# Patient Record
Sex: FEMALE | Race: WHITE
[De-identification: names, ages, dates, MRNs, and addresses within clinical notes are randomized per-mention and may not be internally consistent; named-entity substitution may affect disease eponyms.]

---

## 2020-08-18 ENCOUNTER — HOSPITAL ENCOUNTER (INPATIENT)
Dept: HOSPITAL 36 - GERO | Age: 75
LOS: 14 days | Discharge: NURSING, REHAB OR LONG-TERM CARE | DRG: 885 | End: 2020-09-01
Attending: PSYCHIATRY & NEUROLOGY | Admitting: PSYCHIATRY & NEUROLOGY
Payer: MEDICARE

## 2020-08-18 DIAGNOSIS — E03.9: ICD-10-CM

## 2020-08-18 DIAGNOSIS — I10: ICD-10-CM

## 2020-08-18 DIAGNOSIS — Z88.2: ICD-10-CM

## 2020-08-18 DIAGNOSIS — Z20.828: ICD-10-CM

## 2020-08-18 DIAGNOSIS — Z88.1: ICD-10-CM

## 2020-08-18 DIAGNOSIS — R62.7: ICD-10-CM

## 2020-08-18 DIAGNOSIS — Z88.0: ICD-10-CM

## 2020-08-18 DIAGNOSIS — E78.5: ICD-10-CM

## 2020-08-18 DIAGNOSIS — E87.6: ICD-10-CM

## 2020-08-18 DIAGNOSIS — E11.65: ICD-10-CM

## 2020-08-18 DIAGNOSIS — Z88.8: ICD-10-CM

## 2020-08-18 DIAGNOSIS — F33.3: Primary | ICD-10-CM

## 2020-08-18 PROCEDURE — Z7610: HCPCS

## 2020-08-18 PROCEDURE — X3904: HCPCS

## 2020-08-19 VITALS — SYSTOLIC BLOOD PRESSURE: 130 MMHG | DIASTOLIC BLOOD PRESSURE: 80 MMHG

## 2020-08-19 LAB
CHOLEST SERPL-MCNC: 206 MG/DL (ref ?–200)
LDLC SERPL DIRECT ASSAY-MCNC: 125 MG/DL (ref 0–129)
TRIGL SERPL-MCNC: 169 MG/DL (ref 30–150)

## 2020-08-19 NOTE — HISTORY & PHYSICAL
ADMIT DATE:  08/19/2020



HISTORY OF PRESENT ILLNESS:  We have a 75-year-old female with hypertension and

hyperlipidemia, who presents to the ER at [facility].  The patient has

psychosis.  The patient was transferred here.  The patient admits to

hypertension, hyperlipidemia.  The patient denies any chest pain, shortness of

breath, nausea, vomiting, abdominal pain, diarrhea.



PAST MEDICAL HISTORY:

1.  Hypertension.

2.  Hyperlipidemia.



PAST SURGICAL HISTORY:  None.



MEDICATIONS:  List reviewed.



ALLERGIES:  None.



SOCIAL HISTORY:  Tobacco, IV drugs, ETOH negative.



PHYSICAL EXAMINATION:

VITAL SIGNS:  Temperature is 97.6, pulse 80, respirations 20, blood pressure

130/80.

HEENT:  Normocephalic, atraumatic head exam.

NECK:  Supple.

CARDIOVASCULAR:  Regular rate and rhythm.

LUNGS:  Decreased breath sounds.

ABDOMEN:  Soft, nontender.

EXTREMITIES:  No edema, cyanosis or clubbing.

NEUROLOGIC:  Cranial nerve exam is grossly intact.



ASSESSMENT AND PLAN:

1.  Hypertension.

2.  Hyperlipidemia.

3.  Hyperglycemia.



PLAN:  The patient will be continued on home meds.  We will discuss with

psychiatrist plus medical management.





DD: 08/19/2020 12:56

DT: 08/19/2020 13:05

JOB# [id number]  [id number]

## 2020-08-19 NOTE — HISTORY & PHYSICAL
ADMIT DATE:     08/19/2020



IDENTIFYING INFORMATION:  The patient is a 75-year-old female.



CHIEF COMPLAINT:  "I'm here because my back hurts."



HISTORY OF PRESENT ILLNESS:  Danger to self, grave disability.  The patient

became agitated when her  increase her psych medication.  The patient

also not eating and not sleeping, unable to care for herself under the care of

Dr. Quiñonez.  When I talked to her the patient has no clue why she is here, she

said "I am here because of my back problems.  I told her she was in psych

facility and she could not explain it.  She said she was hospitalized before in

a hospital because of back pain and because of similar situation, so she was not

a very good historian.  The patient denies any auditory or visual

hallucinations.  She reports she sleeps well, eats well.  The patient onset of

illness.  The patient is not sure if she has any psychiatric condition, however,

she has a history of depression.  The patient with a history of depression and

anxiety.  The patient denies prior suicide attempt.  She reports she was

hospitalized because of her back problems.  She does see a psychiatrist and she

said because of back pain, so she is a poor historian, very poor insight.  She

denies prior suicide attempt.  She does not want to harm herself today or

anyone.  The patient denies feeling paranoid.  She sleeps well, eats well.



PAST PSYCHIATRIC HISTORY:  One prior hospitalization because of back pain in a

psychiatric facility.



SUBSTANCE ABUSE HISTORY:  The patient denies.



MEDICAL HISTORY:  Deferred to the medical doctor.



ALLERGIES:  THE PATIENT IS ALLERGIC TO LEVOFLOXACIN, PENICILLIN,

SULFAMETHOXAZOLE, .



MEDICATIONS:  The patient is not on any psychotropic medication.



FAMILY AND SOCIAL HISTORY:  The patient reports she has been  a long

time.  Unable to tell me how long she has 2 children, a boy and a girl.  The

patient reports she has high school education, some college.  She said she is

reluctant.  She is currently retired.  Denies substance abuse, denies family

psychotic disorder; however, she is a poor historian.



MENTAL STATUS EXAMINATION:  The patient was appropriately dressed, not very

groomed.  She was in bed.  Her affect is flat.  Thoughts are concrete.  Speech

is coherent.  She was in a long-term memory is good.  She remember her age, date

of birth.  Recent memory is poor.  She is not sure of the results of the

situation that led to her admission.  The patient reports sleeps well, eats

well.  The patient denies any intent to harm herself or anyone; however, she has

been unable to take care of her ADLs.  The patient was a poor historian, unable

to tell me the day.  She unable tell me the . 

Long-term goals poor, cannot remember the .  Her

concentration is poor.  Unable to answer questions appropriately.  Recent memory

is poor, does not really remember the events that her admission.  Her insight

about her illness is poor, does not realize her problems are.  Judgment is poor

with her being unable to care for self.



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

cognitive disorder, not otherwise specified.



Her assets, she wants to get help.  Negative poor coping skills.



PLAN:  The patient will be started on Cymbalta.  We will do group therapy,

milieu therapy, and individual therapy.



ESTIMATED LENGTH OF STAY:  3-7 days.



DISCHARGE CRITERIA:  Decreasing depression, able to go to after discharge,

outpatient treatment.





DD: 08/19/2020 08:50

DT: 08/19/2020 14:06

JOB# [id number]  [id number]

HANNAH

## 2020-08-20 LAB — HBA1C BLD-MCNC: 6.9 % (ref 4.8–5.6)

## 2020-08-20 NOTE — INTERNAL MEDICINE PROG NOTE
Internal Medicine Subjective





- Subjective


Service Date: 08/20/20


Patient seen and examined:: without staff


Patient is:: awake


Patient Complaints of:: congestion


Per staff patient has:: no adverse event, no episodes of fall





Internal Medicine Objective





- Results


Recent Labs: 


 Laboratory Last Values











POC Glucose  132 MG/DL (70 - 105)  H  08/19/20  02:47    


 


Hemoglobin A1c  6.9 % (4.8-5.6)  H  08/19/20  10:35    


 


Triglycerides  169 mg/dL ()  H  08/19/20  10:35    


 


Cholesterol  206 mg/dL (<200)  H  08/19/20  10:35    


 


LDL Cholesterol  125 mg/dL (0-129)   08/19/20  10:35    


 


HDL Cholesterol  53 mg/dL (>55)  L  08/19/20  10:35    














- Physical Exam


Vitals and I&O: 


 Vital Signs











Temp  97.9 F   08/20/20 14:00


 


Pulse  92   08/20/20 14:00


 


Resp  20   08/20/20 14:00


 


BP  103/75   08/20/20 14:00


 


Pulse Ox  97   08/20/20 14:00








 Intake & Output











 08/19/20 08/20/20 08/20/20





 18:59 06:59 18:59


 


Intake Total 1200 120 


 


Balance 1200 120 


 


Intake:   


 


  Oral 1200 120 


 


Other:   


 


  # Voids  3 


 


  # Bowel Movements 3  


 


  Stool Characteristics Soft Soft Soft





 Brown Brown Brown











Active Medications: 


Current Medications





Acetaminophen (Tylenol)  650 mg PO Q4H PRN


   PRN Reason: Pain (Mild 1-3)


   Stop: 10/18/20 03:15


Acetaminophen (Tylenol Extra Strength)  1,000 mg PO Q6H PRN


   PRN Reason: Pain (Moderate 4-6)


   Stop: 10/18/20 03:17


Al Hydrox/Mg Hydrox/Simethicone (Maalox)  30 ml PO Q4HR PRN


   PRN Reason: GI DISTRESS


   Stop: 10/18/20 03:29


Atorvastatin Calcium (Lipitor)  10 mg PO DAILY [facility]; Protocol


   Stop: 10/19/20 08:59


   Last Admin: 08/20/20 09:10 Dose:  10 mg


Duloxetine HCl (Cymbalta)  60 mg PO DAILY [facility]; Protocol


   Stop: 10/19/20 09:59


   Last Admin: 08/20/20 11:00 Dose:  60 mg


Hydrochlorothiazide (Hctz)  12.5 mg PO DAILY SERGIO


   Stop: 10/19/20 08:59


   Last Admin: 08/20/20 09:10 Dose:  12.5 mg


Ibuprofen (Motrin)  400 mg PO Q4H PRN


   PRN Reason: Pain (Severe 7-10)


   Stop: 10/18/20 03:17


Lorazepam (Ativan)  0.5 mg PO Q4HR PRN; Protocol


   PRN Reason: Anxiety


   Stop: 09/18/20 03:29


Magnesium Hydroxide (Milk Of Magnesia)  30 ml PO HS PRN


   PRN Reason: Constipation


Metoprolol Tartrate (Lopressor)  25 mg PO BID SERGIO


   Stop: 10/19/20 08:59


   Last Admin: 08/20/20 09:12 Dose:  25 mg


Multivitamins/Vitamin C (Theragran)  1 tab PO DAILY SERGIO


   Stop: 10/18/20 08:59


   Last Admin: 08/20/20 09:12 Dose:  1 tab


Zolpidem Tartrate (Ambien)  5 mg PO HS PRN


   PRN Reason: Insomnia


   Stop: 10/18/20 03:29








General: weak


HEENT: NC/AT, PERRLA, EOMI


Neck: Supple


Lungs: CTAB


Cardiovascular: RRR, Normal S1, Normal S2


Abdomen: soft, non-tender


Extremities: clear





- Procedures


Procedures: 


 Procedures











Procedure Code Date


 


APPLY FOREARM SPLINT 01409 05/13/16


 


GROUP PSYCHOTHERAPY 03757 05/26/16


 


GROUP PSYCHOTHERAPY GZHZZZZ 05/26/16


 


IMMOBILIZATION OF LEFT LOWER ARM USING SPLINT [id number] 05/13/16


 


OTHER GROUP THERAPY 94.44 05/27/15


 


REPOSITION LEFT RADIUS WITH INT FIX, OPEN APPROACH [id number] 05/22/16


 


TREAT FX RAD INTRA-ARTICUL 42968 05/22/16














Internal Medicine Assmt/Plan





- Assessment


Assessment: 





1.  HTN


2.  D.M.


3.  Hypothyroidism





- Plan


Plan: 





check cbc, cmp


reivewed labs


d/w r.n.


reviewed complete medical records

## 2020-08-21 LAB
BASOPHILS # BLD AUTO: 0.1 K/UL (ref 0–0.2)
BASOPHILS NFR BLD AUTO: 0.7 % (ref 0–2)
EOSINOPHIL # BLD AUTO: 0.1 K/UL (ref 0–0.4)
EOSINOPHIL NFR BLD AUTO: 0.9 % (ref 0–4)
ERYTHROCYTE [DISTWIDTH] IN BLOOD BY AUTOMATED COUNT: 14.3 % (ref 9–15)
HCT VFR BLD CALC: 42.7 % (ref 36–48)
HGB BLD-MCNC: 14 G/DL (ref 12–16)
LYMPHOCYTE AB SER FC-ACNC: 2 K/UL (ref 1–5.5)
LYMPHOCYTES NFR BLD AUTO: 25.1 % (ref 20.5–51.5)
MCH RBC QN AUTO: 29 PG (ref 27–31)
MCHC RBC AUTO-ENTMCNC: 33 % (ref 32–36)
MCV RBC AUTO: 89 FL (ref 79–98)
MONOCYTES # BLD AUTO: 0.4 K/UL (ref 0–1)
MONOCYTES NFR BLD AUTO: 5.2 % (ref 1.7–9.3)
NEUTROPHILS # BLD: 5.4 K/UL (ref 1.8–7.7)
NEUTROPHILS NFR BLD AUTO: 68.1 % (ref 40–70)
PLATELET # BLD: 210 K/UL (ref 130–430)
RBC # BLD AUTO: 4.82 MIL/UL (ref 4.2–6.2)
WBC # BLD AUTO: 7.9 K/UL (ref 4.8–10.8)

## 2020-08-21 NOTE — INTERNAL MEDICINE PROG NOTE
Internal Medicine Subjective





- Subjective


Service Date: 20


Patient seen and examined:: without staff


Patient is:: awake


Patient Complaints of:: congestion


Per staff patient has:: no adverse event, no episodes of fall





Internal Medicine Objective





- Results


Result Diagrams: 


 20 13:30





Recent Labs: 


 Laboratory Last Values











WBC  7.9 K/uL (4.8-10.8)   20  13:30    


 


RBC  4.82 MIL/uL (4.2-6.2)   20  13:30    


 


Hgb  14.0 g/dL (12.0-16.0)   20  13:30    


 


Hct  42.7 % (36-48)   20  13:30    


 


MCV  89 fL (79.0-98.0)   20  13:30    


 


MCH  29 pg (27-31)   20  13:30    


 


MCHC  33 % (32-36)   20  13:30    


 


RDW  14.3 % (9.0-15.0)   20  13:30    


 


Plt Count  210 K/uL (130-430)   20  13:30    


 


MPV  8.8 fl (7.4-10.4)   20  13:30    


 


Neut % (Auto)  68.1 % (40-70)   20  13:30    


 


Lymph % (Auto)  25.1 % (20.5-51.5)   20  13:30    


 


Mono % (Auto)  5.2 % (1.7-9.3)   20  13:30    


 


Eos % (Auto)  0.9 % (0-4)   20  13:30    


 


Baso % (Auto)  0.7 % (0.0-2.0)   20  13:30    


 


Neut # (Auto)  5.4 K/uL (1.8-7.7)   20  13:30    


 


Lymph # (Auto)  2.0 K/uL (1.0-5.5)   20  13:30    


 


Mono # (Auto)  0.4 K/uL (0.0-1.0)   20  13:30    


 


Eos # (Auto)  0.1 K/uL (0.0-0.4)   20  13:30    


 


Baso # (Auto)  0.1 K/uL (0.0-0.2)   20  13:30    


 


POC Glucose  132 MG/DL (70 - 105)  H  20  02:47    


 


Hemoglobin A1c  6.9 % (4.8-5.6)  H  20  10:35    


 


Triglycerides  169 mg/dL ()  H  20  10:35    


 


Cholesterol  206 mg/dL (<200)  H  20  10:35    


 


LDL Cholesterol  125 mg/dL (0-129)   20  10:35    


 


HDL Cholesterol  53 mg/dL (>55)  L  20  10:35    


 


TSH  3.96 uIU/ml (0.358-3.740)  H  20  13:30    


 


Coronavirus (PCR)  NOT DETECTED  (NOT DETECTD)   20  18:40    














- Physical Exam


Vitals and I&O: 


 Vital Signs











Temp  97.7 F   20 14:00


 


Pulse  91   20 14:00


 


Resp  20   20 14:00


 


BP  143/54   20 14:00


 


Pulse Ox  96   20 14:00








 Intake & Output











 20





 18:59 06:59 18:59


 


Intake Total 860 120 


 


Balance 860 120 


 


Intake:   


 


  Oral 860 120 


 


Other:   


 


  # Voids 3 3 


 


  # Bowel Movements 0  


 


  Stool Characteristics Soft Soft Soft





 Brown Brown Brown











Active Medications: 


Current Medications





Acetaminophen (Tylenol)  650 mg PO Q4H PRN


   PRN Reason: Pain (Mild 1-3)


   Stop: 10/18/20 03:15


Acetaminophen (Tylenol Extra Strength)  1,000 mg PO Q6H PRN


   PRN Reason: Pain (Moderate 4-6)


   Stop: 10/18/20 03:17


Al Hydrox/Mg Hydrox/Simethicone (Maalox)  30 ml PO Q4HR PRN


   PRN Reason: GI DISTRESS


   Stop: 10/18/20 03:29


Atorvastatin Calcium (Lipitor)  10 mg PO DAILY [facility]; Protocol


   Stop: 10/19/20 08:59


   Last Admin: 20 09:07 Dose:  10 mg


Duloxetine HCl (Cymbalta)  60 mg PO DAILY [facility]; Protocol


   Stop: 10/19/20 09:59


   Last Admin: 20 09:07 Dose:  60 mg


Hydrochlorothiazide (Hctz)  12.5 mg PO DAILY SERGIO


   Stop: 10/19/20 08:59


   Last Admin: 20 09:07 Dose:  12.5 mg


Ibuprofen (Motrin)  400 mg PO Q4H PRN


   PRN Reason: Pain (Severe 7-10)


   Stop: 10/18/20 03:17


Lorazepam (Ativan)  0.5 mg PO Q4HR PRN; Protocol


   PRN Reason: Anxiety


   Stop: 20 03:29


   Last Admin: 20 06:58 Dose:  0.5 mg


Magnesium Hydroxide (Milk Of Magnesia)  30 ml PO HS PRN


   PRN Reason: Constipation


Metoprolol Succinate (Toprol Xl)  50 mg PO DAILY SERGIO


   Stop: 10/20/20 08:59


   Last Admin: 20 09:07 Dose:  50 mg


Multivitamins/Vitamin C (Theragran)  1 tab PO DAILY SERGIO


   Stop: 10/18/20 08:59


   Last Admin: 20 09:08 Dose:  1 tab


Quetiapine Fumarate (Seroquel)  12.5 mg PO BID SERGIO; Protocol


   Stop: 10/20/20 16:59


Zolpidem Tartrate (Ambien)  5 mg PO HS PRN


   PRN Reason: Insomnia


   Stop: 10/18/20 03:29








General: weak


HEENT: NC/AT, PERRLA, EOMI


Neck: Supple


Lungs: CTAB


Cardiovascular: RRR, Normal S1, Normal S2


Abdomen: soft, non-tender


Extremities: clear





- Procedures


Procedures: 


 Procedures











Procedure Code Date


 


APPLY FOREARM SPLINT 11930 16


 


GROUP PSYCHOTHERAPY 50372 16


 


GROUP PSYCHOTHERAPY GZHZZZZ 16


 


IMMOBILIZATION OF LEFT LOWER ARM USING SPLINT [id number] 16


 


OTHER GROUP THERAPY 94.44 05/27/15


 


REPOSITION LEFT RADIUS WITH INT FIX, OPEN APPROACH [id number] 16


 


TREAT FX RAD INTRA-ARTICUL 61586 16














Internal Medicine Assmt/Plan





- Assessment


Assessment: 





1.  HTN


2.  D.M.


3.  Hypothyroidism





- Plan


Plan: 





check bmp


d/w r.n.


reviewed complete medical records





Nutritional Asmnt/Malnutr-PDOC





- Dietary Evaluation


Malnutrition Findings (Please click <Entered> for more info): 








Nutritional Asmnt/Malnutrition                             Start:  20 11:

54


Text:                                                      Status: Complete    

  


Freq:                                                                          

  


Protocol:                                                                      

  


 Document     20 12:02  ANASTASIYA  (Rec: 20 12:14  ANASTASIYA [id number]

-01)


 Nutritional Asmnt/Malnutrition


     Patient General Information


      Nutritional Screening                      Moderate Risk


      Diagnosis                                  Psychosis


      Pertinent Medical Hx/Surgical Hx           HTN, Hyperlipidemia


      Subjective Information                     Pt is a 75-year-old female


                                                 admitted on  d/t grave


                                                 disability and danger to self.


                                                 Pt is eating an estimated 0-


                                                 75%% of meals Per Meal/


                                                 Nutrition Activity Record


                                                 since admit date (x2 days), pt


                                                 ate 75% meals on admit day


                                                 and refused breakfast and


                                                 lunch yesterday, eating 25%


                                                 dinner. Dietary is currently


                                                 providing an estimated 2020


                                                 kcals and 95 gm Pro. Visited


                                                 pt in room, CNA stated the


                                                 patient is refusing everything


                                                 today, just having liquids.


                                                 Got her a Glucerna shake in


                                                 case she decides not to eat


                                                 lunch. Pt did not want to talk


                                                 further. Pt need some


                                                 adjusting time, will F/U in 3-


                                                 5 days and continue to monitor


                                                 PO intake and provide


                                                 Glucerna as appropriate.


                                                 Recommend adding CCHO to


                                                 current diet d/t labs ()


                                                 A1c 6.9%, POC glucose 132.


                                                 Anthropometrics


                                                 HT: 58


                                                 WT: 203 LB (92.27 kg)


                                                 ABW: 156 LB (70.80 kg)


                                                 BMI: 30.87 (Obese, class I)


                                                 GI/ Skin Integrity


                                                 GI: WNL, Soft, Large, Non-


                                                 tender


                                                 BM: 8/19 x3


                                                 I/O: 980/Not Noted


                                                 Skin: Wound and bruises, RT/LT


                                                 hand bruise, LT Forearm skin


                                                 tear


                                                 Ward: 17


                                                 Diet Order: Cardiac


                                                 Estimated Energy Needs: (Obese


                                                 , ABW)


                                                 7005-0767 kcals (20-25 kcals/


                                                 kg)


                                                 60-70g Pro (0.8-1.0 g/kg)


                                                 8571-0086 ml (20-25 ml/kg)


      Current Diet Order/ Nutrition Support      Cardiac


      Patient / S.O                              Can


      Pertinent Medications                      Maalox (PRN), Lipitor,


                                                 Hydrochlorothiazide, MOM (PRN)


                                                 , Theragran


      Pertinent Labs                             : Triglycerides 169,


                                                 Cholesterol 206, HDL 53, A1c 6


                                                 .9%, POC glucose 132


     Nutritional Hx/Data


      Height                                     1.73 m


      Height (Calculated Centimeters)            172.7


      Current Weight (lbs)                       92.079 kg


      Weight (Calculated Kilograms)              92.1


      Weight (Calculated Grams)                  05605.3


      Ideal Body Weight                          140 LB (63.64 kg)


      % Ideal Body Weight                        145


      Body Mass Index (BMI)                      30.9


      Weight Status                              Obese


     GI Symptoms


      Last BM                                    8/19 x3


      Skin Integrity/Comment:                    Skin: Wound and bruises, RT/LT


                                                 hand bruise, LT Forearm skin


                                                 tear


                                                 Ward: 17


      Current %PO                                Fair (50-74%)


     Estimated Nutritional Goals


      BEE in Kcals:                              Adj wt of IBW


      Calories/Kcals/Kg                          20-25


      Kcals Calculated                           5314-5849


      Protein:                                   Adj wt of IBW


      Protein g/k.8-1.0


      Protein Calculated                         60-70


      Fluid: ml                                  1497-5807 ml (20-25 ml/kg)


     Nutritional Problem


      2. Problem


       Problem                                   Obese, class I


       Etiology                                  r/t consistent energy


                                                 overconsumption


       Signs/Symptoms:                           aeb BMI >30 (30.90).


      1. Problem


       Problem                                   Impaired nutrient utilization


       Etiology                                  r/t endocrine dysfuction


       Signs/Symptoms:                           aeb labs () A1c 6.9%, POC


                                                 glucose 132.


     Intervention/Recommendation


      Comments                                   1.Recommend adding CCHO to


                                                 current diet Rx.


                                                 2.Provide Glucerna Hunger


                                                 Smart as appropriate when PO <


                                                 50%.


     Expected Outcomes/Goals


      Expected Outcomes/Goals                    1.PO intake to meet 75% of


                                                 estimated nutritional needs.


                                                 2.Monitor PO intake, wt,


                                                 nutrition related labs to


                                                 trend WNL, and skin integrity


                                                 to trend WNL.


                                                 3.F/U as moderate risk in 3-5


                                                 days, -.

## 2020-08-21 NOTE — PROGRESS NOTES
DATE:  08/21/2020



[id number] SUBJECTIVE:  Chart was reviewed and the patient interviewed.  Also

discussed the patient's condition with the staff and reviewed records and labs. 

The patient is still angry and in irritable mood.  The patient also is

exhibiting lack of motivations and is still showing poor interest and isolative

and withdrawn.  The patient also is sleeping a lot and refusing to eat.  Also,

the patient has episodes of yelling and screaming for no reason.



During interview, the patient seems to be responding to stimuli and easily

irritable and easily agitated.  Otherwise, the patient is compliant with taking

Cymbalta that was increased to 60 mg every day yesterday with no side effects.



ASSESSMENT:  The patient is still depressed, but also seems to be psychotic and

agitated.



TREATMENT PLAN:  Continue to monitor behavior and condition closely.  Also, we

will add Seroquel in a dose of 12.5 mg twice a day.  Also, continue to work on

both her ineffective coping as well as her psychosis and continue to follow up

closely.





DD: 08/21/2020 17:59

DT: 08/21/2020 18:04

JOB# [id number]  [id number]

## 2020-08-21 NOTE — PROGRESS NOTES
DATE:  08/20/2020



PSYCHIATRIC PROGRESS NOTE



This also will be for yesterday 08/20/2020.



SUBJECTIVE:  Chart reviewed and the patient interviewed.  Also discussed the

patient's condition with the staff and reviewed records and labs.  The patient

continued to be in a depressed mood and isolative and withdrawn.  The patient

also is not interacting much with peers or others and she wants to be left

alone.  The patient also is easily irritable and easily agitated, but is not

exhibiting any behavioral problems except at times seems to be actively

responding.  Also, the patient is selectively interacting with others and

selectively mute during interview.  The patient is showing no motivations and

seems to be hopeless.



ASSESSMENT:  The patient is still severely depressed and seems to be psychotic

and responding.



TREATMENT PLAN:  Continue to monitor her behavior and her condition.  Also, we

will increase Cymbalta to 60 mg every day and continue to follow up closely.





DD: 08/21/2020 18:13

DT: 08/21/2020 18:34

JOB# [id number]  [id number]

## 2020-08-22 NOTE — PROGRESS NOTES
DATE:  08/22/2020



Covering for Quin Quiñonez M.D.



SUBJECTIVE:  The patient was interviewed.  Case was discussed with staff.  Chart

and records were reviewed.  The patient has had poor motivation for treatment,

has been withdrawn, has been depressed, has been with poor hygiene.  Staff needs

significant prompting and assistance to help the patient attend to her basic

hygiene and eat her meals.  The patient was recently started on Seroquel.  No

side effects noted.  The patient is selectively mute throughout the interview. 

Poor eye contact, not engaging at all with the interview, appears to be restless

and anxious in her wheelchair.



MENTAL STATUS EXAMINATION:  The patient is with improved hygiene, freshly

showered with the assistance of staff.  She is restless and anxious in her

wheelchair.  She has poor eye contact.  Speech is selectively mute.  Mood and

affect appear to be anxious and labile.  Thought process appears to be somewhat

disorganized at this time.  Unable to assess for suicidal or homicidal thoughts.

 She appears to be internally preoccupied.  The patient is alert and oriented to

her name, otherwise unable to assess.  Insight, judgment and impulse control

appear to be quite poor at this time.



ASSESSMENT AND PLAN:  The patient continues to require acute inpatient

psychiatric hospitalization.  We will continue her medications as prescribed. 

No side effects have been noted.  We will also encourage the patient to

verbalize her needs and participate in group and milieu therapy.





DD: 08/22/2020 11:24

DT: 08/22/2020 11:39

JOB# [id number]  [id number]

## 2020-08-22 NOTE — INTERNAL MEDICINE PROG NOTE
Internal Medicine Subjective





- Subjective


Service Date: 20


Patient seen and examined:: without staff (no polyuria, no polyphagia, no 

polydypsia)


Patient is:: awake


Patient Complaints of:: congestion


Per staff patient has:: no adverse event, no episodes of fall





Internal Medicine Objective





- Results


Result Diagrams: 


 20 13:30





Recent Labs: 


 Laboratory Last Values











WBC  7.9 K/uL (4.8-10.8)   20  13:30    


 


RBC  4.82 MIL/uL (4.2-6.2)   20  13:30    


 


Hgb  14.0 g/dL (12.0-16.0)   20  13:30    


 


Hct  42.7 % (36-48)   20  13:30    


 


MCV  89 fL (79.0-98.0)   20  13:30    


 


MCH  29 pg (27-31)   20  13:30    


 


MCHC  33 % (32-36)   20  13:30    


 


RDW  14.3 % (9.0-15.0)   20  13:30    


 


Plt Count  210 K/uL (130-430)   20  13:30    


 


MPV  8.8 fl (7.4-10.4)   20  13:30    


 


Neut % (Auto)  68.1 % (40-70)   20  13:30    


 


Lymph % (Auto)  25.1 % (20.5-51.5)   20  13:30    


 


Mono % (Auto)  5.2 % (1.7-9.3)   20  13:30    


 


Eos % (Auto)  0.9 % (0-4)   20  13:30    


 


Baso % (Auto)  0.7 % (0.0-2.0)   20  13:30    


 


Neut # (Auto)  5.4 K/uL (1.8-7.7)   20  13:30    


 


Lymph # (Auto)  2.0 K/uL (1.0-5.5)   20  13:30    


 


Mono # (Auto)  0.4 K/uL (0.0-1.0)   20  13:30    


 


Eos # (Auto)  0.1 K/uL (0.0-0.4)   20  13:30    


 


Baso # (Auto)  0.1 K/uL (0.0-0.2)   20  13:30    


 


POC Glucose  132 MG/DL (70 - 105)  H  20  02:47    


 


Hemoglobin A1c  6.9 % (4.8-5.6)  H  20  10:35    


 


Triglycerides  169 mg/dL ()  H  20  10:35    


 


Cholesterol  206 mg/dL (<200)  H  20  10:35    


 


LDL Cholesterol  125 mg/dL (0-129)   20  10:35    


 


HDL Cholesterol  53 mg/dL (>55)  L  20  10:35    


 


TSH  3.96 uIU/ml (0.358-3.740)  H  20  13:30    


 


Coronavirus (PCR)  NOT DETECTED  (NOT DETECTD)   20  18:40    














- Physical Exam


Vitals and I&O: 


 Vital Signs











Temp  97.4 F   20 06:11


 


Pulse  84   20 08:19


 


Resp  19   20 06:11


 


BP  153/71   20 08:19


 


Pulse Ox  94   20 06:11








 Intake & Output











 20





 18:59 06:59 18:59


 


Intake Total 860 300 


 


Output Total  1 


 


Balance 860 299 


 


Intake:   


 


  Oral 860 300 


 


Output:   


 


  Urine/Stool Mix  1 


 


Other:   


 


  # Voids 3 1 


 


  # Bowel Movements 1 0 


 


  Stool Characteristics Soft Soft Soft





 Brown Brown Brown











Active Medications: 


Current Medications





Acetaminophen (Tylenol)  650 mg PO Q4H PRN


   PRN Reason: Pain (Mild 1-3)


   Stop: 10/18/20 03:15


Acetaminophen (Tylenol Extra Strength)  1,000 mg PO Q6H PRN


   PRN Reason: Pain (Moderate 4-6)


   Stop: 10/18/20 03:17


Al Hydrox/Mg Hydrox/Simethicone (Maalox)  30 ml PO Q4HR PRN


   PRN Reason: GI DISTRESS


   Stop: 10/18/20 03:29


Atorvastatin Calcium (Lipitor)  10 mg PO DAILY SERGIO; Protocol


   Stop: 10/19/20 08:59


   Last Admin: 20 08:18 Dose:  10 mg


Duloxetine HCl (Cymbalta)  60 mg PO DAILY [facility]; Protocol


   Stop: 10/19/20 09:59


   Last Admin: 20 08:18 Dose:  60 mg


Hydrochlorothiazide (Hctz)  12.5 mg PO DAILY SERGIO


   Stop: 10/19/20 08:59


   Last Admin: 20 08:19 Dose:  12.5 mg


Ibuprofen (Motrin)  400 mg PO Q4H PRN


   PRN Reason: Pain (Severe 7-10)


   Stop: 10/18/20 03:17


Lorazepam (Ativan)  0.5 mg PO Q4HR PRN; Protocol


   PRN Reason: Anxiety


   Stop: 20 03:29


   Last Admin: 20 06:58 Dose:  0.5 mg


Magnesium Hydroxide (Milk Of Magnesia)  30 ml PO HS PRN


   PRN Reason: Constipation


Metoprolol Succinate (Toprol Xl)  50 mg PO DAILY [facility]


   Stop: 10/20/20 08:59


   Last Admin: 20 08:19 Dose:  50 mg


Multivitamins/Vitamin C (Theragran)  1 tab PO DAILY SERGIO


   Stop: 10/18/20 08:59


   Last Admin: 20 08:19 Dose:  1 tab


Quetiapine Fumarate (Seroquel)  12.5 mg PO BID [facility]; Protocol


   Stop: 10/20/20 16:59


   Last Admin: 20 08:19 Dose:  12.5 mg


Zolpidem Tartrate (Ambien)  5 mg PO HS PRN


   PRN Reason: Insomnia


   Stop: 10/18/20 03:29








General: weak


HEENT: NC/AT, PERRLA, EOMI


Neck: Supple


Lungs: CTAB


Cardiovascular: RRR, Normal S1, Normal S2


Abdomen: soft, non-tender


Extremities: clear





- Procedures


Procedures: 


 Procedures











Procedure Code Date


 


APPLY FOREARM SPLINT 79184 16


 


GROUP PSYCHOTHERAPY 79763 16


 


GROUP PSYCHOTHERAPY [id number] 16


 


IMMOBILIZATION OF LEFT LOWER ARM USING SPLINT [id number] 16


 


OTHER GROUP THERAPY 94.44 05/27/15


 


REPOSITION LEFT RADIUS WITH INT FIX, OPEN APPROACH 5LAK40U 16


 


TREAT FX RAD INTRA-ARTICUL 49312 16














Internal Medicine Assmt/Plan





- Assessment


Assessment: 





1. DM, new diagnosis


2. HTN


3.  Hypothyroidism





- Plan


Plan: 





will start glucophage once lab results are back


d/w r.n.


reviewed complete medical records





Nutritional Asmnt/Malnutr-PDOC





- Dietary Evaluation


Malnutrition Findings (Please click <Entered> for more info): 








Nutritional Asmnt/Malnutrition                             Start:  20 11:

54


Text:                                                      Status: Complete    

  


Freq:                                                                          

  


Protocol:                                                                      

  


 Document     20 12:02  ANASTASIYA  (Rec: 20 12:14  ANASTASIYA [id number]

-01)


 Nutritional Asmnt/Malnutrition


     Patient General Information


      Nutritional Screening                      Moderate Risk


      Diagnosis                                  Psychosis


      Pertinent Medical Hx/Surgical Hx           HTN, Hyperlipidemia


      Subjective Information                     Pt is a 75-year-old female


                                                 admitted on  d/t grave


                                                 disability and danger to self.


                                                 Pt is eating an estimated 0-


                                                 75%% of meals Per Meal/


                                                 Nutrition Activity Record


                                                 since admit date (x2 days), pt


                                                 ate 75% meals on admit day


                                                 and refused breakfast and


                                                 lunch yesterday, eating 25%


                                                 dinner. Dietary is currently


                                                 providing an estimated 2020


                                                 kcals and 95 gm Pro. Visited


                                                 pt in room, CNA stated the


                                                 patient is refusing everything


                                                 today, just having liquids.


                                                 Got her a Glucerna shake in


                                                 case she decides not to eat


                                                 lunch. Pt did not want to talk


                                                 further. Pt need some


                                                 adjusting time, will F/U in 3-


                                                 5 days and continue to monitor


                                                 PO intake and provide


                                                 Glucerna as appropriate.


                                                 Recommend adding CCHO to


                                                 current diet d/t labs ()


                                                 A1c 6.9%, POC glucose 132.


                                                 Anthropometrics


                                                 HT: 58


                                                 WT: 203 LB (92.27 kg)


                                                 ABW: 156 LB (70.80 kg)


                                                 BMI: 30.87 (Obese, class I)


                                                 GI/ Skin Integrity


                                                 GI: WNL, Soft, Large, Non-


                                                 tender


                                                 BM: 8/19 x3


                                                 I/O: 980/Not Noted


                                                 Skin: Wound and bruises, RT/LT


                                                 hand bruise, LT Forearm skin


                                                 tear


                                                 Ward: 17


                                                 Diet Order: Cardiac


                                                 Estimated Energy Needs: (Obese


                                                 , ABW)


                                                 5845-8888 kcals (20-25 kcals/


                                                 kg)


                                                 60-70g Pro (0.8-1.0 g/kg)


                                                 1890-4970 ml (20-25 ml/kg)


      Current Diet Order/ Nutrition Support      Cardiac


      Patient / S.O                              Can


      Pertinent Medications                      Maalox (PRN), Lipitor,


                                                 Hydrochlorothiazide, MOM (PRN)


                                                 , Theragran


      Pertinent Labs                             : Triglycerides 169,


                                                 Cholesterol 206, HDL 53, A1c 6


                                                 .9%, POC glucose 132


     Nutritional Hx/Data


      Height                                     1.73 m


      Height (Calculated Centimeters)            172.7


      Current Weight (lbs)                       92.079 kg


      Weight (Calculated Kilograms)              92.1


      Weight (Calculated Grams)                  63097.3


      Ideal Body Weight                          140 LB (63.64 kg)


      % Ideal Body Weight                        145


      Body Mass Index (BMI)                      30.9


      Weight Status                              Obese


     GI Symptoms


      Last BM                                    8/19 x3


      Skin Integrity/Comment:                    Skin: Wound and bruises, RT/LT


                                                 hand bruise, LT Forearm skin


                                                 tear


                                                 Ward: 17


      Current %PO                                Fair (50-74%)


     Estimated Nutritional Goals


      BEE in Kcals:                              Adj wt of IBW


      Calories/Kcals/Kg                          20-25


      Kcals Calculated                           1041-6636


      Protein:                                   Adj wt of IBW


      Protein g/k.8-1.0


      Protein Calculated                         60-70


      Fluid: ml                                  8641-7717 ml (20-25 ml/kg)


     Nutritional Problem


      2. Problem


       Problem                                   Obese, class I


       Etiology                                  r/t consistent energy


                                                 overconsumption


       Signs/Symptoms:                           aeb BMI >30 (30.90).


      1. Problem


       Problem                                   Impaired nutrient utilization


       Etiology                                  r/t endocrine dysfuction


       Signs/Symptoms:                           aeb labs () A1c 6.9%, POC


                                                 glucose 132.


     Intervention/Recommendation


      Comments                                   1.Recommend adding CCHO to


                                                 current diet Rx.


                                                 2.Provide Glucerna Hunger


                                                 Smart as appropriate when PO <


                                                 50%.


     Expected Outcomes/Goals


      Expected Outcomes/Goals                    1.PO intake to meet 75% of


                                                 estimated nutritional needs.


                                                 2.Monitor PO intake, wt,


                                                 nutrition related labs to


                                                 trend WNL, and skin integrity


                                                 to trend WNL.


                                                 3.F/U as moderate risk in 3-5


                                                 days, -.

## 2020-08-23 NOTE — INTERNAL MEDICINE PROG NOTE
Internal Medicine Subjective





- Subjective


Service Date: 20


Patient seen and examined:: without staff


Patient is:: awake


Patient Complaints of:: congestion


Per staff patient has:: no adverse event, no episodes of fall





Internal Medicine Objective





- Results


Result Diagrams: 


 20 13:30





Recent Labs: 


 Laboratory Last Values











WBC  7.9 K/uL (4.8-10.8)   20  13:30    


 


RBC  4.82 MIL/uL (4.2-6.2)   20  13:30    


 


Hgb  14.0 g/dL (12.0-16.0)   20  13:30    


 


Hct  42.7 % (36-48)   20  13:30    


 


MCV  89 fL (79.0-98.0)   20  13:30    


 


MCH  29 pg (27-31)   20  13:30    


 


MCHC  33 % (32-36)   20  13:30    


 


RDW  14.3 % (9.0-15.0)   20  13:30    


 


Plt Count  210 K/uL (130-430)   20  13:30    


 


MPV  8.8 fl (7.4-10.4)   20  13:30    


 


Neut % (Auto)  68.1 % (40-70)   20  13:30    


 


Lymph % (Auto)  25.1 % (20.5-51.5)   20  13:30    


 


Mono % (Auto)  5.2 % (1.7-9.3)   20  13:30    


 


Eos % (Auto)  0.9 % (0-4)   20  13:30    


 


Baso % (Auto)  0.7 % (0.0-2.0)   20  13:30    


 


Neut # (Auto)  5.4 K/uL (1.8-7.7)   20  13:30    


 


Lymph # (Auto)  2.0 K/uL (1.0-5.5)   20  13:30    


 


Mono # (Auto)  0.4 K/uL (0.0-1.0)   20  13:30    


 


Eos # (Auto)  0.1 K/uL (0.0-0.4)   20  13:30    


 


Baso # (Auto)  0.1 K/uL (0.0-0.2)   20  13:30    


 


POC Glucose  132 MG/DL (70 - 105)  H  20  02:47    


 


Hemoglobin A1c  6.9 % (4.8-5.6)  H  20  10:35    


 


Triglycerides  169 mg/dL ()  H  20  10:35    


 


Cholesterol  206 mg/dL (<200)  H  20  10:35    


 


LDL Cholesterol  125 mg/dL (0-129)   20  10:35    


 


HDL Cholesterol  53 mg/dL (>55)  L  20  10:35    


 


TSH  3.96 uIU/ml (0.358-3.740)  H  20  13:30    


 


Coronavirus (PCR)  NOT DETECTED  (NOT DETECTD)   20  18:40    














- Physical Exam


Vitals and I&O: 


 Vital Signs











Temp  97.3 F   20 05:58


 


Pulse  91   20 09:22


 


Resp  20   20 08:00


 


BP  139/59   20 09:23


 


Pulse Ox  92   20 05:58








 Intake & Output











 20





 18:59 06:59 18:59


 


Intake Total 660 240 


 


Balance 660 240 


 


Intake:   


 


  Oral 660 240 


 


Other:   


 


  # Voids 2 2 


 


  # Bowel Movements 0  


 


  Stool Characteristics Soft Soft 





 Brown Brown 











Active Medications: 


Current Medications





Acetaminophen (Tylenol)  650 mg PO Q4H PRN


   PRN Reason: Pain (Mild 1-3)


   Stop: 10/18/20 03:15


Acetaminophen (Tylenol Extra Strength)  1,000 mg PO Q6H PRN


   PRN Reason: Pain (Moderate 4-6)


   Stop: 10/18/20 03:17


Al Hydrox/Mg Hydrox/Simethicone (Maalox)  30 ml PO Q4HR PRN


   PRN Reason: GI DISTRESS


   Stop: 10/18/20 03:29


Atorvastatin Calcium (Lipitor)  10 mg PO DAILY [facility]; Protocol


   Stop: 10/19/20 08:59


   Last Admin: 20 09:21 Dose:  10 mg


Duloxetine HCl (Cymbalta)  60 mg PO DAILY [facility]; Protocol


   Stop: 10/19/20 09:59


   Last Admin: 20 09:22 Dose:  60 mg


Hydrochlorothiazide (Hctz)  12.5 mg PO DAILY [facility]


   Stop: 10/19/20 08:59


   Last Admin: 20 09:23 Dose:  12.5 mg


Ibuprofen (Motrin)  400 mg PO Q4H PRN


   PRN Reason: Pain (Severe 7-10)


   Stop: 10/18/20 03:17


Lorazepam (Ativan)  0.5 mg PO Q4HR PRN; Protocol


   PRN Reason: Anxiety


   Stop: 20 03:29


   Last Admin: 20 06:58 Dose:  0.5 mg


Magnesium Hydroxide (Milk Of Magnesia)  30 ml PO HS PRN


   PRN Reason: Constipation


Metoprolol Succinate (Toprol Xl)  50 mg PO DAILY [facility]


   Stop: 10/20/20 08:59


   Last Admin: 20 09:22 Dose:  50 mg


Multivitamins/Vitamin C (Theragran)  1 tab PO DAILY [facility]


   Stop: 10/18/20 08:59


   Last Admin: 20 09:21 Dose:  1 tab


Quetiapine Fumarate (Seroquel)  12.5 mg PO BID [facility]; Protocol


   Stop: 10/20/20 16:59


   Last Admin: 20 09:22 Dose:  12.5 mg


Zolpidem Tartrate (Ambien)  5 mg PO HS PRN


   PRN Reason: Insomnia


   Stop: 10/18/20 03:29








General: weak


HEENT: NC/AT, PERRLA, EOMI


Neck: Supple


Lungs: CTAB


Cardiovascular: RRR, Normal S1, Normal S2


Abdomen: soft, non-tender


Extremities: clear


Neurological: no change





- Procedures


Procedures: 


 Procedures











Procedure Code Date


 


APPLY FOREARM SPLINT 96198 16


 


GROUP PSYCHOTHERAPY 02096 16


 


GROUP PSYCHOTHERAPY [id number] 16


 


IMMOBILIZATION OF LEFT LOWER ARM USING SPLINT 8E4OY2T 16


 


OTHER GROUP THERAPY 94.44 05/27/15


 


REPOSITION LEFT RADIUS WITH INT FIX, OPEN APPROACH 5JXX23P 16


 


TREAT FX RAD INTRA-ARTICUL 91210 16














Internal Medicine Assmt/Plan





- Assessment


Assessment: 





1. DM, new diagnosis


2. HTN


3. Hypothyroidism





- Plan


Plan: 





will start glucophage once lab results are back


d/w r.n.


reviewed complete medical records





Nutritional Asmnt/Malnutr-PDOC





- Dietary Evaluation


Malnutrition Findings (Please click <Entered> for more info): 








Nutritional Asmnt/Malnutrition                             Start:  20 11:

54


Text:                                                      Status: Complete    

  


Freq:                                                                          

  


Protocol:                                                                      

  


 Document     20 12:02  ANASTASIYA  (Rec: 20 12:14  ANASTASIYA [id number]

-01)


 Nutritional Asmnt/Malnutrition


     Patient General Information


      Nutritional Screening                      Moderate Risk


      Diagnosis                                  Psychosis


      Pertinent Medical Hx/Surgical Hx           HTN, Hyperlipidemia


      Subjective Information                     Pt is a 75-year-old female


                                                 admitted on  d/t grave


                                                 disability and danger to self.


                                                 Pt is eating an estimated 0-


                                                 75%% of meals Per Meal/


                                                 Nutrition Activity Record


                                                 since admit date (x2 days), pt


                                                 ate 75% meals on admit day


                                                 and refused breakfast and


                                                 lunch yesterday, eating 25%


                                                 dinner. Dietary is currently


                                                 providing an estimated 2020


                                                 kcals and 95 gm Pro. Visited


                                                 pt in room, CNA stated the


                                                 patient is refusing everything


                                                 today, just having liquids.


                                                 Got her a Glucerna shake in


                                                 case she decides not to eat


                                                 lunch. Pt did not want to talk


                                                 further. Pt need some


                                                 adjusting time, will F/U in 3-


                                                 5 days and continue to monitor


                                                 PO intake and provide


                                                 Glucerna as appropriate.


                                                 Recommend adding CCHO to


                                                 current diet d/t labs ()


                                                 A1c 6.9%, POC glucose 132.


                                                 Anthropometrics


                                                 HT: 58


                                                 WT: 203 LB (92.27 kg)


                                                 ABW: 156 LB (70.80 kg)


                                                 BMI: 30.87 (Obese, class I)


                                                 GI/ Skin Integrity


                                                 GI: WNL, Soft, Large, Non-


                                                 tender


                                                 BM: 8/19 x3


                                                 I/O: 980/Not Noted


                                                 Skin: Wound and bruises, RT/LT


                                                 hand bruise, LT Forearm skin


                                                 tear


                                                 Ward: 17


                                                 Diet Order: Cardiac


                                                 Estimated Energy Needs: (Obese


                                                 , ABW)


                                                 3395-0497 kcals (20-25 kcals/


                                                 kg)


                                                 60-70g Pro (0.8-1.0 g/kg)


                                                 9008-5001 ml (20-25 ml/kg)


      Current Diet Order/ Nutrition Support      Cardiac


      Patient / S.O                              Can


      Pertinent Medications                      Maalox (PRN), Lipitor,


                                                 Hydrochlorothiazide, MOM (PRN)


                                                 , Theragran


      Pertinent Labs                             : Triglycerides 169,


                                                 Cholesterol 206, HDL 53, A1c 6


                                                 .9%, POC glucose 132


     Nutritional Hx/Data


      Height                                     1.73 m


      Height (Calculated Centimeters)            172.7


      Current Weight (lbs)                       92.079 kg


      Weight (Calculated Kilograms)              92.1


      Weight (Calculated Grams)                  08789.3


      Ideal Body Weight                          140 LB (63.64 kg)


      % Ideal Body Weight                        145


      Body Mass Index (BMI)                      30.9


      Weight Status                              Obese


     GI Symptoms


      Last BM                                    8/19 x3


      Skin Integrity/Comment:                    Skin: Wound and bruises, RT/LT


                                                 hand bruise, LT Forearm skin


                                                 tear


                                                 Ward: 17


      Current %PO                                Fair (50-74%)


     Estimated Nutritional Goals


      BEE in Kcals:                              Adj wt of IBW


      Calories/Kcals/Kg                          20-25


      Kcals Calculated                           1666-5681


      Protein:                                   Adj wt of IBW


      Protein g/k.8-1.0


      Protein Calculated                         60-70


      Fluid: ml                                  3728-0348 ml (20-25 ml/kg)


     Nutritional Problem


      2. Problem


       Problem                                   Obese, class I


       Etiology                                  r/t consistent energy


                                                 overconsumption


       Signs/Symptoms:                           aeb BMI >30 (30.90).


      1. Problem


       Problem                                   Impaired nutrient utilization


       Etiology                                  r/t endocrine dysfuction


       Signs/Symptoms:                           aeb labs () A1c 6.9%, POC


                                                 glucose 132.


     Intervention/Recommendation


      Comments                                   1.Recommend adding CCHO to


                                                 current diet Rx.


                                                 2.Provide Glucerna Hunger


                                                 Smart as appropriate when PO <


                                                 50%.


     Expected Outcomes/Goals


      Expected Outcomes/Goals                    1.PO intake to meet 75% of


                                                 estimated nutritional needs.


                                                 2.Monitor PO intake, wt,


                                                 nutrition related labs to


                                                 trend WNL, and skin integrity


                                                 to trend WNL.


                                                 3.F/U as moderate risk in 3-5


                                                 days, -.

## 2020-08-23 NOTE — PROGRESS NOTES
DATE:  08/23/2020



Covering for Quin Quiñonez M.D.



SUBJECTIVE:  The patient was interviewed.  Case was discussed with staff.  Chart

and records were reviewed.  Per the staff, the patient has been more calm today,

but remains selectively mute, withdrawn, forgetful and confused.  The patient

was visited this morning in the dayroom.  She is sitting in her wheelchair,

looking down, not able to maintain any eye contact, remains selectively mute,

not answering any questions.  Appears with improved hygiene due to staff

assistance in helping the patient take a shower and clean herself up.  The

patient otherwise is disorganized and uncooperative and has no plan for

self-care.



MENTAL STATUS EXAMINATION:  The patient is an elderly female sitting in the

wheelchair.  Poor eye contact, looking down on the ground, disorganized

thinking, selectively mute, very poor insight, judgment and impulse control. 

Alert and oriented to her name, otherwise unable to assess.



ASSESSMENT AND PLAN:  We will continue the patient's acute psychiatric

hospitalization given the severity of her symptoms.  Also, encouraged the

patient to verbalize needs and participate in group and milieu therapy and also

attend to her hygiene.  We will continue medications for now.  No side effects

have been noted.





DD: 08/23/2020 10:47

DT: 08/23/2020 11:25

JOB# [id number]  [id number]

## 2020-08-24 LAB
BASOPHILS # BLD AUTO: 0.1 K/UL (ref 0–0.2)
BASOPHILS NFR BLD AUTO: 1.1 % (ref 0–2)
EOSINOPHIL # BLD AUTO: 0.1 K/UL (ref 0–0.4)
EOSINOPHIL NFR BLD AUTO: 1.4 % (ref 0–4)
ERYTHROCYTE [DISTWIDTH] IN BLOOD BY AUTOMATED COUNT: 14.3 % (ref 9–15)
HCT VFR BLD CALC: 37.6 % (ref 36–48)
HGB BLD-MCNC: 12.9 G/DL (ref 12–16)
LYMPHOCYTE AB SER FC-ACNC: 2.3 K/UL (ref 1–5.5)
LYMPHOCYTES NFR BLD AUTO: 27.4 % (ref 20.5–51.5)
MCH RBC QN AUTO: 30 PG (ref 27–31)
MCHC RBC AUTO-ENTMCNC: 34 % (ref 32–36)
MCV RBC AUTO: 87 FL (ref 79–98)
MONOCYTES # BLD AUTO: 0.5 K/UL (ref 0–1)
MONOCYTES NFR BLD AUTO: 5.5 % (ref 1.7–9.3)
NEUTROPHILS # BLD: 5.3 K/UL (ref 1.8–7.7)
NEUTROPHILS NFR BLD AUTO: 64.6 % (ref 40–70)
PLATELET # BLD: 202 K/UL (ref 130–430)
RBC # BLD AUTO: 4.31 MIL/UL (ref 4.2–6.2)
WBC # BLD AUTO: 8.3 K/UL (ref 4.8–10.8)

## 2020-08-24 NOTE — INTERNAL MEDICINE PROG NOTE
Internal Medicine Subjective





- Subjective


Service Date: 20


Patient seen and examined:: without staff


Patient is:: awake


Patient Complaints of:: congestion


Per staff patient has:: no adverse event, no episodes of fall





Internal Medicine Objective





- Results


Result Diagrams: 


 20 08:05





Recent Labs: 


 Laboratory Last Values











WBC  8.3 K/uL (4.8-10.8)   20  08:05    


 


RBC  4.31 MIL/uL (4.2-6.2)   20  08:05    


 


Hgb  12.9 g/dL (12.0-16.0)   20  08:05    


 


Hct  37.6 % (36-48)   20  08:05    


 


MCV  87 fL (79.0-98.0)   20  08:05    


 


MCH  30 pg (27-31)   20  08:05    


 


MCHC  34 % (32-36)   20  08:05    


 


RDW  14.3 % (9.0-15.0)   20  08:05    


 


Plt Count  202 K/uL (130-430)   20  08:05    


 


MPV  9.4 fl (7.4-10.4)   20  08:05    


 


Neut % (Auto)  64.6 % (40-70)   20  08:05    


 


Lymph % (Auto)  27.4 % (20.5-51.5)   20  08:05    


 


Mono % (Auto)  5.5 % (1.7-9.3)   20  08:05    


 


Eos % (Auto)  1.4 % (0-4)   20  08:05    


 


Baso % (Auto)  1.1 % (0.0-2.0)   20  08:05    


 


Neut # (Auto)  5.3 K/uL (1.8-7.7)   20  08:05    


 


Lymph # (Auto)  2.3 K/uL (1.0-5.5)   20  08:05    


 


Mono # (Auto)  0.5 K/uL (0.0-1.0)   20  08:05    


 


Eos # (Auto)  0.1 K/uL (0.0-0.4)   20  08:05    


 


Baso # (Auto)  0.1 K/uL (0.0-0.2)   20  08:05    


 


POC Glucose  132 MG/DL (70 - 105)  H  20  02:47    


 


Hemoglobin A1c  6.9 % (4.8-5.6)  H  20  10:35    


 


Triglycerides  169 mg/dL ()  H  20  10:35    


 


Cholesterol  206 mg/dL (<200)  H  20  10:35    


 


LDL Cholesterol  125 mg/dL (0-129)   20  10:35    


 


HDL Cholesterol  53 mg/dL (>55)  L  20  10:35    


 


TSH  3.96 uIU/ml (0.358-3.740)  H  20  13:30    


 


Coronavirus (PCR)  NOT DETECTED  (NOT DETECTD)   20  18:40    














- Physical Exam


Vitals and I&O: 


 Vital Signs











Temp  97 F   20 06:02


 


Pulse  73   20 09:05


 


Resp  18   20 08:00


 


BP  125/47   20 09:05


 


Pulse Ox  92   20 06:02








 Intake & Output











 20





 18:59 06:59 18:59


 


Intake Total 0  


 


Balance 0  


 


Intake:   


 


  Oral 0  


 


Other:   


 


  # Voids  3 


 


  # Bowel Movements  0 


 


  Stool Characteristics  Soft 





  Brown 











Active Medications: 


Current Medications





Acetaminophen (Tylenol)  650 mg PO Q4H PRN


   PRN Reason: Pain (Mild 1-3)


   Stop: 10/18/20 03:15


Acetaminophen (Tylenol Extra Strength)  1,000 mg PO Q6H PRN


   PRN Reason: Pain (Moderate 4-6)


   Stop: 10/18/20 03:17


Al Hydrox/Mg Hydrox/Simethicone (Maalox)  30 ml PO Q4HR PRN


   PRN Reason: GI DISTRESS


   Stop: 10/18/20 03:29


Atorvastatin Calcium (Lipitor)  10 mg PO DAILY [facility]; Protocol


   Stop: 10/19/20 08:59


   Last Admin: 20 09:03 Dose:  10 mg


Duloxetine HCl (Cymbalta)  60 mg PO DAILY [facility]; Protocol


   Stop: 10/19/20 09:59


   Last Admin: 20 09:03 Dose:  60 mg


Hydrochlorothiazide (Hctz)  12.5 mg PO DAILY [facility]


   Stop: 10/19/20 08:59


   Last Admin: 20 09:04 Dose:  Not Given


Ibuprofen (Motrin)  400 mg PO Q4H PRN


   PRN Reason: Pain (Severe 7-10)


   Stop: 10/18/20 03:17


Lorazepam (Ativan)  0.5 mg PO Q4HR PRN; Protocol


   PRN Reason: Anxiety


   Stop: 20 03:29


   Last Admin: 20 06:58 Dose:  0.5 mg


Magnesium Hydroxide (Milk Of Magnesia)  30 ml PO HS PRN


   PRN Reason: Constipation


Metoprolol Succinate (Toprol Xl)  50 mg PO DAILY [facility]


   Stop: 10/20/20 08:59


   Last Admin: 20 09:05 Dose:  Not Given


Multivitamins/Vitamin C (Theragran)  1 tab PO DAILY [facility]


   Stop: 10/18/20 08:59


   Last Admin: 20 09:03 Dose:  1 tab


Quetiapine Fumarate (Seroquel)  25 mg PO BID [facility]; Protocol


   Stop: 10/23/20 16:59


Zolpidem Tartrate (Ambien)  5 mg PO HS PRN


   PRN Reason: Insomnia


   Stop: 10/18/20 03:29








General: weak


HEENT: NC/AT, PERRLA, EOMI


Neck: Supple


Lungs: CTAB


Cardiovascular: RRR, Normal S1, Normal S2


Abdomen: soft, non-tender


Extremities: clear


Neurological: no change





- Procedures


Procedures: 


 Procedures











Procedure Code Date


 


APPLY FOREARM SPLINT 85142 16


 


GROUP PSYCHOTHERAPY 36475 16


 


GROUP PSYCHOTHERAPY [id number] 16


 


IMMOBILIZATION OF LEFT LOWER ARM USING SPLINT [id number] 16


 


OTHER GROUP THERAPY 94.44 05/27/15


 


REPOSITION LEFT RADIUS WITH INT FIX, OPEN APPROACH [id number] 16


 


TREAT FX RAD INTRA-ARTICUL 21579 16














Internal Medicine Assmt/Plan





- Assessment


Assessment: 





1. DM, new diagnosis


2. HTN


3. Hypothyroidism





- Plan


Plan: 





will start glucophage once lab results are back


d/w r.n.


reviewed complete medical records





Nutritional Asmnt/Malnutr-PDOC





- Dietary Evaluation


Malnutrition Findings (Please click <Entered> for more info): 








Nutritional Asmnt/Malnutrition                             Start:  20 11:

54


Text:                                                      Status: Complete    

  


Freq:                                                                          

  


Protocol:                                                                      

  


 Document     20 12:02  ANASTASIYA  (Rec: 20 12:14  ANASTASIYA [id number]

-01)


 Nutritional Asmnt/Malnutrition


     Patient General Information


      Nutritional Screening                      Moderate Risk


      Diagnosis                                  Psychosis


      Pertinent Medical Hx/Surgical Hx           HTN, Hyperlipidemia


      Subjective Information                     Pt is a 75-year-old female


                                                 admitted on  d/t grave


                                                 disability and danger to self.


                                                 Pt is eating an estimated 0-


                                                 75%% of meals Per Meal/


                                                 Nutrition Activity Record


                                                 since admit date (x2 days), pt


                                                 ate 75% meals on admit day


                                                 and refused breakfast and


                                                 lunch yesterday, eating 25%


                                                 dinner. Dietary is currently


                                                 providing an estimated 2020


                                                 kcals and 95 gm Pro. Visited


                                                 pt in room, CNA stated the


                                                 patient is refusing everything


                                                 today, just having liquids.


                                                 Got her a Glucerna shake in


                                                 case she decides not to eat


                                                 lunch. Pt did not want to talk


                                                 further. Pt need some


                                                 adjusting time, will F/U in 3-


                                                 5 days and continue to monitor


                                                 PO intake and provide


                                                 Glucerna as appropriate.


                                                 Recommend adding CCHO to


                                                 current diet d/t labs ()


                                                 A1c 6.9%, POC glucose 132.


                                                 Anthropometrics


                                                 HT: 58


                                                 WT: 203 LB (92.27 kg)


                                                 ABW: 156 LB (70.80 kg)


                                                 BMI: 30.87 (Obese, class I)


                                                 GI/ Skin Integrity


                                                 GI: WNL, Soft, Large, Non-


                                                 tender


                                                 BM: 8/19 x3


                                                 I/O: 980/Not Noted


                                                 Skin: Wound and bruises, RT/LT


                                                 hand bruise, LT Forearm skin


                                                 tear


                                                 Ward: 17


                                                 Diet Order: Cardiac


                                                 Estimated Energy Needs: (Obese


                                                 , ABW)


                                                 4817-3598 kcals (20-25 kcals/


                                                 kg)


                                                 60-70g Pro (0.8-1.0 g/kg)


                                                 1751-9739 ml (20-25 ml/kg)


      Current Diet Order/ Nutrition Support      Cardiac


      Patient / S.O                              Can


      Pertinent Medications                      Maalox (PRN), Lipitor,


                                                 Hydrochlorothiazide, MOM (PRN)


                                                 , Theragran


      Pertinent Labs                             : Triglycerides 169,


                                                 Cholesterol 206, HDL 53, A1c 6


                                                 .9%, POC glucose 132


     Nutritional Hx/Data


      Height                                     1.73 m


      Height (Calculated Centimeters)            172.7


      Current Weight (lbs)                       92.079 kg


      Weight (Calculated Kilograms)              92.1


      Weight (Calculated Grams)                  97025.3


      Ideal Body Weight                          140 LB (63.64 kg)


      % Ideal Body Weight                        145


      Body Mass Index (BMI)                      30.9


      Weight Status                              Obese


     GI Symptoms


      Last BM                                    8/19 x3


      Skin Integrity/Comment:                    Skin: Wound and bruises, RT/LT


                                                 hand bruise, LT Forearm skin


                                                 tear


                                                 Ward: 17


      Current %PO                                Fair (50-74%)


     Estimated Nutritional Goals


      BEE in Kcals:                              Adj wt of IBW


      Calories/Kcals/Kg                          20-25


      Kcals Calculated                           1698-4325


      Protein:                                   Adj wt of IBW


      Protein g/k.8-1.0


      Protein Calculated                         60-70


      Fluid: ml                                  1495-0343 ml (20-25 ml/kg)


     Nutritional Problem


      2. Problem


       Problem                                   Obese, class I


       Etiology                                  r/t consistent energy


                                                 overconsumption


       Signs/Symptoms:                           aeb BMI >30 (30.90).


      1. Problem


       Problem                                   Impaired nutrient utilization


       Etiology                                  r/t endocrine dysfuction


       Signs/Symptoms:                           aeb labs () A1c 6.9%, POC


                                                 glucose 132.


     Intervention/Recommendation


      Comments                                   1.Recommend adding CCHO to


                                                 current diet Rx.


                                                 2.Provide Glucerna Hunger


                                                 Smart as appropriate when PO <


                                                 50%.


     Expected Outcomes/Goals


      Expected Outcomes/Goals                    1.PO intake to meet 75% of


                                                 estimated nutritional needs.


                                                 2.Monitor PO intake, wt,


                                                 nutrition related labs to


                                                 trend WNL, and skin integrity


                                                 to trend WNL.


                                                 3.F/U as moderate risk in 3-5


                                                 days, -.

## 2020-08-24 NOTE — PROGRESS NOTES
DATE:  08/24/2020



Case was discussed with staff of the patient and reviewed records.  Covering for

Dr. Quiñonez this morning because I did her admission.  I saw her upon admission. 

The patient has been withdrawn, selectively mute, forgetful, confused, unable to

make safe plan for self-care.  She uses a wheelchair.  She continues to be

disorganized, uncooperative, needing redirection.  She has been compliant with

the medication with no side effects, no sedation, no nausea, and no

extrapyramidal symptoms.  She was started on Seroquel 12.5 mg twice a day, I

will be increasing the dose to 25 mg twice a day.  She has no side effects to

the medication, no sedation, no nausea, and no extrapyramidal symptoms.  She is

already on also Cymbalta that was increased by Dr. Quiñonez to 60 mg twice a day. 

We will continue to work with the patient in group therapy, milieu therapy, and

adjust the medications as needed.





DD: 08/24/2020 12:15

DT: 08/24/2020 22:25

JOB# [id number]  [id number]

## 2020-08-25 NOTE — INTERNAL MEDICINE PROG NOTE
Internal Medicine Subjective





- Subjective


Service Date: 20 (asleep)


Patient seen and examined:: without staff


Patient is:: awake


Patient Complaints of:: congestion


Per staff patient has:: no adverse event, no episodes of fall





Internal Medicine Objective





- Results


Result Diagrams: 


 20 08:05





Recent Labs: 


 Laboratory Last Values











WBC  8.3 K/uL (4.8-10.8)   20  08:05    


 


RBC  4.31 MIL/uL (4.2-6.2)   20  08:05    


 


Hgb  12.9 g/dL (12.0-16.0)   20  08:05    


 


Hct  37.6 % (36-48)   20  08:05    


 


MCV  87 fL (79.0-98.0)   20  08:05    


 


MCH  30 pg (27-31)   20  08:05    


 


MCHC  34 % (32-36)   20  08:05    


 


RDW  14.3 % (9.0-15.0)   20  08:05    


 


Plt Count  202 K/uL (130-430)   20  08:05    


 


MPV  9.4 fl (7.4-10.4)   20  08:05    


 


Neut % (Auto)  64.6 % (40-70)   20  08:05    


 


Lymph % (Auto)  27.4 % (20.5-51.5)   20  08:05    


 


Mono % (Auto)  5.5 % (1.7-9.3)   20  08:05    


 


Eos % (Auto)  1.4 % (0-4)   20  08:05    


 


Baso % (Auto)  1.1 % (0.0-2.0)   20  08:05    


 


Neut # (Auto)  5.3 K/uL (1.8-7.7)   20  08:05    


 


Lymph # (Auto)  2.3 K/uL (1.0-5.5)   20  08:05    


 


Mono # (Auto)  0.5 K/uL (0.0-1.0)   20  08:05    


 


Eos # (Auto)  0.1 K/uL (0.0-0.4)   20  08:05    


 


Baso # (Auto)  0.1 K/uL (0.0-0.2)   20  08:05    


 


POC Glucose  132 MG/DL (70 - 105)  H  20  02:47    


 


Hemoglobin A1c  6.9 % (4.8-5.6)  H  20  10:35    


 


Triglycerides  169 mg/dL ()  H  20  10:35    


 


Cholesterol  206 mg/dL (<200)  H  20  10:35    


 


LDL Cholesterol  125 mg/dL (0-129)   20  10:35    


 


HDL Cholesterol  53 mg/dL (>55)  L  20  10:35    


 


TSH  3.96 uIU/ml (0.358-3.740)  H  20  13:30    


 


Coronavirus (PCR)  NOT DETECTED  (NOT DETECTD)   20  18:40    














- Physical Exam


Vitals and I&O: 


 Vital Signs











Temp  97.2 F   20 16:52


 


Pulse  101   20 16:52


 


Resp  18   20 16:52


 


BP  141/76   20 16:52


 


Pulse Ox  94   20 16:52








 Intake & Output











 20





 18:59 06:59 18:59


 


Intake Total 400 240 


 


Output Total  1 


 


Balance 400 239 


 


Intake:   


 


  Oral 400 240 


 


Output:   


 


  Urine/Stool Mix  1 


 


Other:   


 


  # Voids 2 3 


 


  # Bowel Movements 0 0 


 


  Stool Characteristics  Soft Soft





  Brown Brown











Active Medications: 


Current Medications





Acetaminophen (Tylenol)  650 mg PO Q4H PRN


   PRN Reason: Pain (Mild 1-3)


   Stop: 10/18/20 03:15


Acetaminophen (Tylenol Extra Strength)  1,000 mg PO Q6H PRN


   PRN Reason: Pain (Moderate 4-6)


   Stop: 10/18/20 03:17


Al Hydrox/Mg Hydrox/Simethicone (Maalox)  30 ml PO Q4HR PRN


   PRN Reason: GI DISTRESS


   Stop: 10/18/20 03:29


Atorvastatin Calcium (Lipitor)  10 mg PO DAILY [facility]; Protocol


   Stop: 10/19/20 08:59


   Last Admin: 20 08:30 Dose:  Not Given


Duloxetine HCl (Cymbalta)  60 mg PO DAILY [facility]; Protocol


   Stop: 10/19/20 09:59


   Last Admin: 20 08:35 Dose:  Not Given


Hydrochlorothiazide (Hctz)  12.5 mg PO DAILY SERGIO


   Stop: 10/19/20 08:59


   Last Admin: 20 08:30 Dose:  Not Given


Ibuprofen (Motrin)  400 mg PO Q4H PRN


   PRN Reason: Pain (Severe 7-10)


   Stop: 10/18/20 03:17


Lorazepam (Ativan)  0.5 mg PO Q4HR PRN; Protocol


   PRN Reason: Anxiety


   Stop: 20 03:29


   Last Admin: 20 06:58 Dose:  0.5 mg


Magnesium Hydroxide (Milk Of Magnesia)  30 ml PO HS PRN


   PRN Reason: Constipation


Metoprolol Succinate (Toprol Xl)  50 mg PO DAILY SERGIO


   Stop: 10/20/20 08:59


   Last Admin: 20 08:30 Dose:  Not Given


Multivitamins/Vitamin C (Theragran)  1 tab PO DAILY [facility]


   Stop: 10/18/20 08:59


   Last Admin: 20 08:30 Dose:  Not Given


Quetiapine Fumarate (Seroquel)  25 mg PO BID [facility]; Protocol


   Stop: 10/23/20 16:59


   Last Admin: 20 16:34 Dose:  Not Given


Zolpidem Tartrate (Ambien)  5 mg PO HS PRN


   PRN Reason: Insomnia


   Stop: 10/18/20 03:29








General: weak


HEENT: NC/AT, PERRLA, EOMI


Neck: Supple


Lungs: CTAB


Cardiovascular: RRR, Normal S1, Normal S2


Abdomen: soft, non-tender


Extremities: clear


Neurological: no change





- Procedures


Procedures: 


 Procedures











Procedure Code Date


 


APPLY FOREARM SPLINT 10248 16


 


GROUP PSYCHOTHERAPY 28299 16


 


GROUP PSYCHOTHERAPY [id number] 16


 


IMMOBILIZATION OF LEFT LOWER ARM USING SPLINT [id number] 16


 


OTHER GROUP THERAPY 94.44 05/27/15


 


REPOSITION LEFT RADIUS WITH INT FIX, OPEN APPROACH [id number] 16


 


TREAT FX RAD INTRA-ARTICUL 09582 16














Internal Medicine Assmt/Plan





- Assessment


Assessment: 





1. DM, new diagnosis


2. HTN


3. Hypothyroidism





- Plan


Plan: 


discussed with I.T. and  about issues with labs


repeat cmp stat


d/w r.n.


reviewed complete medical records





Nutritional Asmnt/Malnutr-PDOC





- Dietary Evaluation


Malnutrition Findings (Please click <Entered> for more info): 








Nutritional Asmnt/Malnutrition                             Start:  20 11:

54


Text:                                                      Status: Complete    

  


Freq:                                                                          

  


Protocol:                                                                      

  


 Document     20 12:02  ANASTASIYA  (Rec: 20 12:14  ANASTASIYA [id number]

-01)


 Nutritional Asmnt/Malnutrition


     Patient General Information


      Nutritional Screening                      Moderate Risk


      Diagnosis                                  Psychosis


      Pertinent Medical Hx/Surgical Hx           HTN, Hyperlipidemia


      Subjective Information                     Pt is a 75-year-old female


                                                 admitted on  d/t grave


                                                 disability and danger to self.


                                                 Pt is eating an estimated 0-


                                                 75%% of meals Per Meal/


                                                 Nutrition Activity Record


                                                 since admit date (x2 days), pt


                                                 ate 75% meals on admit day


                                                 and refused breakfast and


                                                 lunch yesterday, eating 25%


                                                 dinner. Dietary is currently


                                                 providing an estimated 2020


                                                 kcals and 95 gm Pro. Visited


                                                 pt in room, CNA stated the


                                                 patient is refusing everything


                                                 today, just having liquids.


                                                 Got her a Glucerna shake in


                                                 case she decides not to eat


                                                 lunch. Pt did not want to talk


                                                 further. Pt need some


                                                 adjusting time, will F/U in 3-


                                                 5 days and continue to monitor


                                                 PO intake and provide


                                                 Glucerna as appropriate.


                                                 Recommend adding CCHO to


                                                 current diet d/t labs ()


                                                 A1c 6.9%, POC glucose 132.


                                                 Anthropometrics


                                                 HT: 58


                                                 WT: 203 LB (92.27 kg)


                                                 ABW: 156 LB (70.80 kg)


                                                 BMI: 30.87 (Obese, class I)


                                                 GI/ Skin Integrity


                                                 GI: WNL, Soft, Large, Non-


                                                 tender


                                                 BM: 8/19 x3


                                                 I/O: 980/Not Noted


                                                 Skin: Wound and bruises, RT/LT


                                                 hand bruise, LT Forearm skin


                                                 tear


                                                 Ward: 17


                                                 Diet Order: Cardiac


                                                 Estimated Energy Needs: (Obese


                                                 , ABW)


                                                 0125-3880 kcals (20-25 kcals/


                                                 kg)


                                                 60-70g Pro (0.8-1.0 g/kg)


                                                 6091-8370 ml (20-25 ml/kg)


      Current Diet Order/ Nutrition Support      Cardiac


      Patient / S.O                              Can


      Pertinent Medications                      Maalox (PRN), Lipitor,


                                                 Hydrochlorothiazide, MOM (PRN)


                                                 , Theragran


      Pertinent Labs                             : Triglycerides 169,


                                                 Cholesterol 206, HDL 53, A1c 6


                                                 .9%, POC glucose 132


     Nutritional Hx/Data


      Height                                     1.73 m


      Height (Calculated Centimeters)            172.7


      Current Weight (lbs)                       92.079 kg


      Weight (Calculated Kilograms)              92.1


      Weight (Calculated Grams)                  83896.3


      Ideal Body Weight                          140 LB (63.64 kg)


      % Ideal Body Weight                        145


      Body Mass Index (BMI)                      30.9


      Weight Status                              Obese


     GI Symptoms


      Last BM                                    8/19 x3


      Skin Integrity/Comment:                    Skin: Wound and bruises, RT/LT


                                                 hand bruise, LT Forearm skin


                                                 tear


                                                 Ward: 17


      Current %PO                                Fair (50-74%)


     Estimated Nutritional Goals


      BEE in Kcals:                              Adj wt of IBW


      Calories/Kcals/Kg                          20-25


      Kcals Calculated                           5538-1132


      Protein:                                   Adj wt of IBW


      Protein g/k.8-1.0


      Protein Calculated                         60-70


      Fluid: ml                                  1316-6309 ml (20-25 ml/kg)


     Nutritional Problem


      2. Problem


       Problem                                   Obese, class I


       Etiology                                  r/t consistent energy


                                                 overconsumption


       Signs/Symptoms:                           aeb BMI >30 (30.90).


      1. Problem


       Problem                                   Impaired nutrient utilization


       Etiology                                  r/t endocrine dysfuction


       Signs/Symptoms:                           aeb labs () A1c 6.9%, POC


                                                 glucose 132.


     Intervention/Recommendation


      Comments                                   1.Recommend adding CCHO to


                                                 current diet Rx.


                                                 2.Provide Glucerna Hunger


                                                 Smart as appropriate when PO <


                                                 50%.


     Expected Outcomes/Goals


      Expected Outcomes/Goals                    1.PO intake to meet 75% of


                                                 estimated nutritional needs.


                                                 2.Monitor PO intake, wt,


                                                 nutrition related labs to


                                                 trend WNL, and skin integrity


                                                 to trend WNL.


                                                 3.F/U as moderate risk in 3-5


                                                 days, -.

## 2020-08-25 NOTE — PROGRESS NOTES
DATE:  08/25/2020



SUBJECTIVE:  Case was discussed with staff of the patient, reviewed records. 

The patient continues to isolate herself, selectively mute, continues to be

unable to make safe plan for self-care.  Continues to be withdrawn and confused,

unable to make safe plan for self-care.  She is compliant with the medication

with no side effects, no sedation, no nausea, no extrapyramidal symptoms.  She

tolerated the increase in Seroquel to 25 mg twice a day.  We will continue

outpatient group therapy, milieu therapy, and adjust medication as needed.





DD: 08/25/2020 11:30

DT: 08/25/2020 19:44

JOB# [id number]  [id number]

## 2020-08-26 LAB
ALT SERPL-CCNC: 28 U/L (ref 12–78)
ANION GAP SERPL CALC-SCNC: 16 MMOL/L (ref 5–15)
AST SERPL-CCNC: 30 U/L (ref 10–37)
BILIRUB SERPL-MCNC: 0.7 MG/DL (ref 0–1)
BUN SERPL-MCNC: 26 MG/DL (ref 8–21)
CALCIUM SERPL-MCNC: 10 MG/DL (ref 8.4–10.2)
CHLORIDE SERPL-SCNC: 95 MMOL/L (ref 98–107)
CO2 SERPL-SCNC: 26 MMOL/L (ref 23–29)
CREAT SERPL-MCNC: 1.07 MG/DL (ref 0.7–1.3)
POTASSIUM SERPL-SCNC: 3 MMOL/L (ref 3.5–5.1)

## 2020-08-26 NOTE — INTERNAL MEDICINE PROG NOTE
Internal Medicine Subjective





- Subjective


Service Date: 20


Patient seen and examined:: without staff


Patient is:: awake


Patient Complaints of:: congestion


Per staff patient has:: no adverse event, no episodes of fall





Internal Medicine Objective





- Results


Result Diagrams: 


 20 08:05





 20 09:55


Recent Labs: 


 Laboratory Last Values











WBC  8.3 K/uL (4.8-10.8)   20  08:05    


 


RBC  4.31 MIL/uL (4.2-6.2)   20  08:05    


 


Hgb  12.9 g/dL (12.0-16.0)   20  08:05    


 


Hct  37.6 % (36-48)   20  08:05    


 


MCV  87 fL (79.0-98.0)   20  08:05    


 


MCH  30 pg (27-31)   20  08:05    


 


MCHC  34 % (32-36)   20  08:05    


 


RDW  14.3 % (9.0-15.0)   20  08:05    


 


Plt Count  202 K/uL (130-430)   20  08:05    


 


MPV  9.4 fl (7.4-10.4)   20  08:05    


 


Neut % (Auto)  64.6 % (40-70)   20  08:05    


 


Lymph % (Auto)  27.4 % (20.5-51.5)   20  08:05    


 


Mono % (Auto)  5.5 % (1.7-9.3)   20  08:05    


 


Eos % (Auto)  1.4 % (0-4)   20  08:05    


 


Baso % (Auto)  1.1 % (0.0-2.0)   20  08:05    


 


Neut # (Auto)  5.3 K/uL (1.8-7.7)   20  08:05    


 


Lymph # (Auto)  2.3 K/uL (1.0-5.5)   20  08:05    


 


Mono # (Auto)  0.5 K/uL (0.0-1.0)   20  08:05    


 


Eos # (Auto)  0.1 K/uL (0.0-0.4)   20  08:05    


 


Baso # (Auto)  0.1 K/uL (0.0-0.2)   20  08:05    


 


Sodium  134 mmol/L (136-145)  L  20  09:55    


 


Potassium  3.0 mmol/L (3.5-5.1)  L  20  09:55    


 


Chloride  95 mmol/L ()  L  20  09:55    


 


Carbon Dioxide  26 mmol/L (23-29)   20  09:55    


 


Anion Gap  16  (5-15)  H  20  09:55    


 


BUN  26 mg/dL (8-21)  H  20  09:55    


 


Creatinine  1.07 mg/dL (0.70-1.30)   20  09:55    


 


Glucose  125 mg/dL (70-99)  H  20  09:55    


 


POC Glucose  132 MG/DL (70 - 105)  H  20  02:47    


 


Hemoglobin A1c  6.9 % (4.8-5.6)  H  20  10:35    


 


Calcium  10.0 mg/dL (8.4-10.2)   20  09:55    


 


Total Bilirubin  0.7 mg/dL (0.0-1.0)   20  09:55    


 


AST  30 U/L (10-37)   20  09:55    


 


ALT  28 U/L (12-78)   20  09:55    


 


Alkaline Phosphatase  99 U/L ()   20  09:55    


 


Total Protein  7.0 g/dL (6.4-8.3)   20  09:55    


 


Albumin  3.4 g/dL (3.4-5.0)   20  09:55    


 


Triglycerides  169 mg/dL ()  H  20  10:35    


 


Cholesterol  206 mg/dL (<200)  H  20  10:35    


 


LDL Cholesterol  125 mg/dL (0-129)   20  10:35    


 


HDL Cholesterol  53 mg/dL (>55)  L  20  10:35    


 


TSH  3.96 uIU/ml (0.358-3.740)  H  20  13:30    


 


Coronavirus (PCR)  NOT DETECTED  (NOT DETECTD)   20  18:40    














- Physical Exam


Vitals and I&O: 


 Vital Signs











Temp  0 F   20 05:40


 


Pulse  107   20 08:50


 


Resp  20   20 08:00


 


BP  143/79   20 08:50


 


Pulse Ox  94   20 16:52








 Intake & Output











 20





 18:59 06:59 18:59


 


Intake Total  120 


 


Output Total  1 


 


Balance  119 


 


Intake:   


 


  Oral  120 


 


Output:   


 


  Urine/Stool Mix  1 


 


Other:   


 


  # Voids  3 


 


  # Bowel Movements  0 


 


  Stool Characteristics Soft Soft 





 Brown Brown 











Active Medications: 


Current Medications





Acetaminophen (Tylenol)  650 mg PO Q4H PRN


   PRN Reason: Pain (Mild 1-3)


   Stop: 10/18/20 03:15


Acetaminophen (Tylenol Extra Strength)  1,000 mg PO Q6H PRN


   PRN Reason: Pain (Moderate 4-6)


   Stop: 10/18/20 03:17


Al Hydrox/Mg Hydrox/Simethicone (Maalox)  30 ml PO Q4HR PRN


   PRN Reason: GI DISTRESS


   Stop: 10/18/20 03:29


Atorvastatin Calcium (Lipitor)  10 mg PO DAILY [facility]; Protocol


   Stop: 10/19/20 08:59


   Last Admin: 20 08:48 Dose:  10 mg


Duloxetine HCl (Cymbalta)  60 mg PO DAILY [facility]; Protocol


   Stop: 10/19/20 09:59


   Last Admin: 20 08:48 Dose:  60 mg


Hydrochlorothiazide (Hctz)  12.5 mg PO DAILY [facility]


   Stop: 10/19/20 08:59


   Last Admin: 20 08:48 Dose:  12.5 mg


Ibuprofen (Motrin)  400 mg PO Q4H PRN


   PRN Reason: Pain (Severe 7-10)


   Stop: 10/18/20 03:17


Lorazepam (Ativan)  0.5 mg PO Q4HR PRN; Protocol


   PRN Reason: Anxiety


   Stop: 20 03:29


   Last Admin: 20 06:58 Dose:  0.5 mg


Magnesium Hydroxide (Milk Of Magnesia)  30 ml PO HS PRN


   PRN Reason: Constipation


Metoprolol Succinate (Toprol Xl)  50 mg PO DAILY [facility]


   Stop: 10/20/20 08:59


   Last Admin: 20 08:50 Dose:  50 mg


Multivitamins/Vitamin C (Theragran)  1 tab PO DAILY [facility]


   Stop: 10/18/20 08:59


   Last Admin: 20 08:50 Dose:  1 tab


Quetiapine Fumarate (Seroquel)  25 mg PO BID [facility]; Protocol


   Stop: 10/23/20 16:59


   Last Admin: 20 08:50 Dose:  25 mg


Zolpidem Tartrate (Ambien)  5 mg PO HS PRN


   PRN Reason: Insomnia


   Stop: 10/18/20 03:29








General: weak


HEENT: NC/AT, PERRLA, EOMI


Neck: Supple


Lungs: CTAB


Cardiovascular: RRR, Normal S1, Normal S2


Abdomen: soft, non-tender


Extremities: clear


Neurological: no change





- Procedures


Procedures: 


 Procedures











Procedure Code Date


 


APPLY FOREARM SPLINT 39877 16


 


GROUP PSYCHOTHERAPY 00750 16


 


GROUP PSYCHOTHERAPY [id number] 16


 


IMMOBILIZATION OF LEFT LOWER ARM USING SPLINT [id number] 16


 


OTHER GROUP THERAPY 94.44 05/27/15


 


REPOSITION LEFT RADIUS WITH INT FIX, OPEN APPROACH [id number] 16


 


TREAT FX RAD INTRA-ARTICUL 86150 16














Internal Medicine Assmt/Plan





- Assessment


Assessment: 





1. DM, new diagnosis


2. HTN


3. Hypothyroidism


4. Hypokalemia





- Plan


Plan: 


stop HCTZ


start glucophage


repeat cmp stat


d/w r.n.


reviewed complete medical records





Nutritional Asmnt/Malnutr-PDOC





- Dietary Evaluation


Malnutrition Findings (Please click <Entered> for more info): 








Nutritional Asmnt/Malnutrition                             Start:  20 11:

54


Text:                                                      Status: Complete    

  


Freq:                                                                          

  


Protocol:                                                                      

  


 Document     20 12:02  ANASTASIYA  (Rec: 20 12:14  ANASTASIYA [id number]

-01)


 Nutritional Asmnt/Malnutrition


     Patient General Information


      Nutritional Screening                      Moderate Risk


      Diagnosis                                  Psychosis


      Pertinent Medical Hx/Surgical Hx           HTN, Hyperlipidemia


      Subjective Information                     Pt is a 75-year-old female


                                                 admitted on  d/t grave


                                                 disability and danger to self.


                                                 Pt is eating an estimated 0-


                                                 75%% of meals Per Meal/


                                                 Nutrition Activity Record


                                                 since admit date (x2 days), pt


                                                 ate 75% meals on admit day


                                                 and refused breakfast and


                                                 lunch yesterday, eating 25%


                                                 dinner. Dietary is currently


                                                 providing an estimated 2020


                                                 kcals and 95 gm Pro. Visited


                                                 pt in room, CNA stated the


                                                 patient is refusing everything


                                                 today, just having liquids.


                                                 Got her a Glucerna shake in


                                                 case she decides not to eat


                                                 lunch. Pt did not want to talk


                                                 further. Pt need some


                                                 adjusting time, will F/U in 3-


                                                 5 days and continue to monitor


                                                 PO intake and provide


                                                 Glucerna as appropriate.


                                                 Recommend adding CCHO to


                                                 current diet d/t labs ()


                                                 A1c 6.9%, POC glucose 132.


                                                 Anthropometrics


                                                 HT: 58


                                                 WT: 203 LB (92.27 kg)


                                                 ABW: 156 LB (70.80 kg)


                                                 BMI: 30.87 (Obese, class I)


                                                 GI/ Skin Integrity


                                                 GI: WNL, Soft, Large, Non-


                                                 tender


                                                 BM: 8/19 x3


                                                 I/O: 980/Not Noted


                                                 Skin: Wound and bruises, RT/LT


                                                 hand bruise, LT Forearm skin


                                                 tear


                                                 Ward: 17


                                                 Diet Order: Cardiac


                                                 Estimated Energy Needs: (Obese


                                                 , ABW)


                                                 5689-7299 kcals (20-25 kcals/


                                                 kg)


                                                 60-70g Pro (0.8-1.0 g/kg)


                                                 7747-5057 ml (20-25 ml/kg)


      Current Diet Order/ Nutrition Support      Cardiac


      Patient / S.O                              Can


      Pertinent Medications                      Maalox (PRN), Lipitor,


                                                 Hydrochlorothiazide, MOM (PRN)


                                                 , Theragran


      Pertinent Labs                             : Triglycerides 169,


                                                 Cholesterol 206, HDL 53, A1c 6


                                                 .9%, POC glucose 132


     Nutritional Hx/Data


      Height                                     1.73 m


      Height (Calculated Centimeters)            172.7


      Current Weight (lbs)                       92.079 kg


      Weight (Calculated Kilograms)              92.1


      Weight (Calculated Grams)                  43234.3


      Ideal Body Weight                          140 LB (63.64 kg)


      % Ideal Body Weight                        145


      Body Mass Index (BMI)                      30.9


      Weight Status                              Obese


     GI Symptoms


      Last BM                                    8/19 x3


      Skin Integrity/Comment:                    Skin: Wound and bruises, RT/LT


                                                 hand bruise, LT Forearm skin


                                                 tear


                                                 Ward: 17


      Current %PO                                Fair (50-74%)


     Estimated Nutritional Goals


      BEE in Kcals:                              Adj wt of IBW


      Calories/Kcals/Kg                          20-25


      Kcals Calculated                           1389-3665


      Protein:                                   Adj wt of IBW


      Protein g/k.8-1.0


      Protein Calculated                         60-70


      Fluid: ml                                  4201-3347 ml (20-25 ml/kg)


     Nutritional Problem


      2. Problem


       Problem                                   Obese, class I


       Etiology                                  r/t consistent energy


                                                 overconsumption


       Signs/Symptoms:                           aeb BMI >30 (30.90).


      1. Problem


       Problem                                   Impaired nutrient utilization


       Etiology                                  r/t endocrine dysfuction


       Signs/Symptoms:                           aeb labs () A1c 6.9%, POC


                                                 glucose 132.


     Intervention/Recommendation


      Comments                                   1.Recommend adding CCHO to


                                                 current diet Rx.


                                                 2.Provide Glucerna Hunger


                                                 Smart as appropriate when PO <


                                                 50%.


     Expected Outcomes/Goals


      Expected Outcomes/Goals                    1.PO intake to meet 75% of


                                                 estimated nutritional needs.


                                                 2.Monitor PO intake, wt,


                                                 nutrition related labs to


                                                 trend WNL, and skin integrity


                                                 to trend WNL.


                                                 3.F/U as moderate risk in 3-5


                                                 days, -.

## 2020-08-26 NOTE — PROGRESS NOTES
DATE:  08/26/2020



SUBJECTIVE:  Chart reviewed and the patient interviewed.  Also discussed the

patient's condition with the staff and reviewed records and labs.  The patient

is easily irritable and easily agitated.  The patient also wants to be left

alone and she is refusing to talk and also refusing to take medications.  The

patient also during interview has poor eye contact and angry and in irritable

mood.  She also is refusing to discuss any issues in regard to her condition or

medications or medications or to express her feelings.



ASSESSMENT:  The patient is still paranoid and depressed.



TREATMENT PLAN:  Encouraged the patient to take medications, which is Cymbalta

and Seroquel.  Also, encouraged the patient to interact more and express herself

more.





DD: 08/26/2020 20:19

DT: 08/26/2020 20:50

[facility]# [id number]  [id number]

## 2020-08-27 NOTE — INTERNAL MEDICINE PROG NOTE
Internal Medicine Subjective





- Subjective


Service Date: 20


Patient seen and examined:: without staff


Patient is:: awake


Patient Complaints of:: congestion


Per staff patient has:: no adverse event, no episodes of fall





Internal Medicine Objective





- Results


Result Diagrams: 


 20 08:05





 20 09:55


Recent Labs: 


 Laboratory Last Values











WBC  8.3 K/uL (4.8-10.8)   20  08:05    


 


RBC  4.31 MIL/uL (4.2-6.2)   20  08:05    


 


Hgb  12.9 g/dL (12.0-16.0)   20  08:05    


 


Hct  37.6 % (36-48)   20  08:05    


 


MCV  87 fL (79.0-98.0)   20  08:05    


 


MCH  30 pg (27-31)   20  08:05    


 


MCHC  34 % (32-36)   20  08:05    


 


RDW  14.3 % (9.0-15.0)   20  08:05    


 


Plt Count  202 K/uL (130-430)   20  08:05    


 


MPV  9.4 fl (7.4-10.4)   20  08:05    


 


Neut % (Auto)  64.6 % (40-70)   20  08:05    


 


Lymph % (Auto)  27.4 % (20.5-51.5)   20  08:05    


 


Mono % (Auto)  5.5 % (1.7-9.3)   20  08:05    


 


Eos % (Auto)  1.4 % (0-4)   20  08:05    


 


Baso % (Auto)  1.1 % (0.0-2.0)   20  08:05    


 


Neut # (Auto)  5.3 K/uL (1.8-7.7)   20  08:05    


 


Lymph # (Auto)  2.3 K/uL (1.0-5.5)   20  08:05    


 


Mono # (Auto)  0.5 K/uL (0.0-1.0)   20  08:05    


 


Eos # (Auto)  0.1 K/uL (0.0-0.4)   20  08:05    


 


Baso # (Auto)  0.1 K/uL (0.0-0.2)   20  08:05    


 


Sodium  134 mmol/L (136-145)  L  20  09:55    


 


Potassium  3.0 mmol/L (3.5-5.1)  L  20  09:55    


 


Chloride  95 mmol/L ()  L  20  09:55    


 


Carbon Dioxide  26 mmol/L (23-29)   20  09:55    


 


Anion Gap  16  (5-15)  H  20  09:55    


 


BUN  26 mg/dL (8-21)  H  20  09:55    


 


Creatinine  1.07 mg/dL (0.70-1.30)   20  09:55    


 


Glucose  125 mg/dL (70-99)  H  20  09:55    


 


POC Glucose  132 MG/DL (70 - 105)  H  20  02:47    


 


Hemoglobin A1c  6.9 % (4.8-5.6)  H  20  10:35    


 


Calcium  10.0 mg/dL (8.4-10.2)   20  09:55    


 


Total Bilirubin  0.7 mg/dL (0.0-1.0)   20  09:55    


 


AST  30 U/L (10-37)   20  09:55    


 


ALT  28 U/L (12-78)   20  09:55    


 


Alkaline Phosphatase  99 U/L ()   20  09:55    


 


Total Protein  7.0 g/dL (6.4-8.3)   20  09:55    


 


Albumin  3.4 g/dL (3.4-5.0)   20  09:55    


 


Triglycerides  169 mg/dL ()  H  20  10:35    


 


Cholesterol  206 mg/dL (<200)  H  20  10:35    


 


LDL Cholesterol  125 mg/dL (0-129)   20  10:35    


 


HDL Cholesterol  53 mg/dL (>55)  L  20  10:35    


 


TSH  3.96 uIU/ml (0.358-3.740)  H  20  13:30    


 


Coronavirus (PCR)  NOT DETECTED  (NOT DETECTD)   20  18:40    














- Physical Exam


Vitals and I&O: 


 Vital Signs











Temp  98.1 F   20 20:00


 


Pulse  88   20 08:21


 


Resp  20   20 08:00


 


BP  123/61   20 08:21


 


Pulse Ox  96   20 20:00








 Intake & Output











 20





 18:59 06:59 18:59


 


Intake Total 640 120 


 


Balance 640 120 


 


Intake:   


 


  Oral 640 120 


 


Other:   


 


  # Voids 2 3 


 


  # Bowel Movements 1  


 


  Stool Characteristics  Soft 





  Brown 











Active Medications: 


Current Medications





Acetaminophen (Tylenol)  650 mg PO Q4H PRN


   PRN Reason: Pain (Mild 1-3)


   Stop: 10/18/20 03:15


Acetaminophen (Tylenol Extra Strength)  1,000 mg PO Q6H PRN


   PRN Reason: Pain (Moderate 4-6)


   Stop: 10/18/20 03:17


Al Hydrox/Mg Hydrox/Simethicone (Maalox)  30 ml PO Q4HR PRN


   PRN Reason: GI DISTRESS


   Stop: 10/18/20 03:29


Atorvastatin Calcium (Lipitor)  10 mg PO DAILY [facility]; Protocol


   Stop: 10/19/20 08:59


   Last Admin: 20 08:20 Dose:  10 mg


Duloxetine HCl (Cymbalta)  60 mg PO DAILY [facility]; Protocol


   Stop: 10/19/20 09:59


   Last Admin: 20 08:20 Dose:  60 mg


Ibuprofen (Motrin)  400 mg PO Q4H PRN


   PRN Reason: Pain (Severe 7-10)


   Stop: 10/18/20 03:17


Lorazepam (Ativan)  0.5 mg PO Q4HR PRN; Protocol


   PRN Reason: Anxiety


   Stop: 20 03:29


   Last Admin: 20 06:58 Dose:  0.5 mg


Magnesium Hydroxide (Milk Of Magnesia)  30 ml PO HS PRN


   PRN Reason: Constipation


Metformin HCl (Glucophage)  500 mg PO DAILY [facility]


   Stop: 10/25/20 13:59


   Last Admin: 20 08:25 Dose:  Not Given


Metoprolol Succinate (Toprol Xl)  50 mg PO DAILY [facility]


   Stop: 10/20/20 08:59


   Last Admin: 20 08:21 Dose:  50 mg


Multivitamins/Vitamin C (Theragran)  1 tab PO DAILY [facility]


   Stop: 10/18/20 08:59


   Last Admin: 20 08:20 Dose:  1 tab


Quetiapine Fumarate (Seroquel)  25 mg PO BID [facility]; Protocol


   Stop: 10/23/20 16:59


   Last Admin: 20 08:20 Dose:  25 mg


Zolpidem Tartrate (Ambien)  5 mg PO HS PRN


   PRN Reason: Insomnia


   Stop: 10/18/20 03:29








General: weak


HEENT: NC/AT, PERRLA, EOMI


Neck: Supple


Lungs: CTAB


Cardiovascular: RRR, Normal S1, Normal S2


Abdomen: soft, non-tender


Extremities: clear


Neurological: no change





- Procedures


Procedures: 


 Procedures











Procedure Code Date


 


APPLY FOREARM SPLINT 68788 16


 


GROUP PSYCHOTHERAPY 49282 16


 


GROUP PSYCHOTHERAPY [id number] 16


 


IMMOBILIZATION OF LEFT LOWER ARM USING SPLINT 9Y4JO5O 16


 


OTHER GROUP THERAPY 94.44 05/27/15


 


REPOSITION LEFT RADIUS WITH INT FIX, OPEN APPROACH [id number] 16


 


TREAT FX RAD INTRA-ARTICUL 37847 16














Internal Medicine Assmt/Plan





- Assessment


Assessment: 





1. DM, new diagnosis


2. HTN


3. Hypothyroidism


4. Hypokalemia





- Plan


Plan: 





continue glucophage


repeat cmp stat


d/w r.n.


reviewed complete medical records





Nutritional Asmnt/Malnutr-PDOC





- Dietary Evaluation


Malnutrition Findings (Please click <Entered> for more info): 








Nutritional Asmnt/Malnutrition                             Start:  20 11:

54


Text:                                                      Status: Complete    

  


Freq:                                                                          

  


Protocol:                                                                      

  


 Document     20 12:02  ANASTASIYA  (Rec: 20 12:14  ANASTASIYA [id number]

-01)


 Nutritional Asmnt/Malnutrition


     Patient General Information


      Nutritional Screening                      Moderate Risk


      Diagnosis                                  Psychosis


      Pertinent Medical Hx/Surgical Hx           HTN, Hyperlipidemia


      Subjective Information                     Pt is a 75-year-old female


                                                 admitted on  d/t grave


                                                 disability and danger to self.


                                                 Pt is eating an estimated 0-


                                                 75%% of meals Per Meal/


                                                 Nutrition Activity Record


                                                 since admit date (x2 days), pt


                                                 ate 75% meals on admit day


                                                 and refused breakfast and


                                                 lunch yesterday, eating 25%


                                                 dinner. Dietary is currently


                                                 providing an estimated 2020


                                                 kcals and 95 gm Pro. Visited


                                                 pt in room, CNA stated the


                                                 patient is refusing everything


                                                 today, just having liquids.


                                                 Got her a Glucerna shake in


                                                 case she decides not to eat


                                                 lunch. Pt did not want to talk


                                                 further. Pt need some


                                                 adjusting time, will F/U in 3-


                                                 5 days and continue to monitor


                                                 PO intake and provide


                                                 Glucerna as appropriate.


                                                 Recommend adding CCHO to


                                                 current diet d/t labs ()


                                                 A1c 6.9%, POC glucose 132.


                                                 Anthropometrics


                                                 HT: 58


                                                 WT: 203 LB (92.27 kg)


                                                 ABW: 156 LB (70.80 kg)


                                                 BMI: 30.87 (Obese, class I)


                                                 GI/ Skin Integrity


                                                 GI: WNL, Soft, Large, Non-


                                                 tender


                                                 BM: 8/19 x3


                                                 I/O: 980/Not Noted


                                                 Skin: Wound and bruises, RT/LT


                                                 hand bruise, LT Forearm skin


                                                 tear


                                                 Ward: 17


                                                 Diet Order: Cardiac


                                                 Estimated Energy Needs: (Obese


                                                 , ABW)


                                                 8007-5683 kcals (20-25 kcals/


                                                 kg)


                                                 60-70g Pro (0.8-1.0 g/kg)


                                                 1941-4027 ml (20-25 ml/kg)


      Current Diet Order/ Nutrition Support      Cardiac


      Patient / S.O                              Can


      Pertinent Medications                      Maalox (PRN), Lipitor,


                                                 Hydrochlorothiazide, MOM (PRN)


                                                 , Theragran


      Pertinent Labs                             : Triglycerides 169,


                                                 Cholesterol 206, HDL 53, A1c 6


                                                 .9%, POC glucose 132


     Nutritional Hx/Data


      Height                                     1.73 m


      Height (Calculated Centimeters)            172.7


      Current Weight (lbs)                       92.079 kg


      Weight (Calculated Kilograms)              92.1


      Weight (Calculated Grams)                  08947.3


      Ideal Body Weight                          140 LB (63.64 kg)


      % Ideal Body Weight                        145


      Body Mass Index (BMI)                      30.9


      Weight Status                              Obese


     GI Symptoms


      Last BM                                    8/19 x3


      Skin Integrity/Comment:                    Skin: Wound and bruises, RT/LT


                                                 hand bruise, LT Forearm skin


                                                 tear


                                                 Ward: 17


      Current %PO                                Fair (50-74%)


     Estimated Nutritional Goals


      BEE in Kcals:                              Adj wt of IBW


      Calories/Kcals/Kg                          20-25


      Kcals Calculated                           2899-6462


      Protein:                                   Adj wt of IBW


      Protein g/k.8-1.0


      Protein Calculated                         60-70


      Fluid: ml                                  4778-2113 ml (20-25 ml/kg)


     Nutritional Problem


      2. Problem


       Problem                                   Obese, class I


       Etiology                                  r/t consistent energy


                                                 overconsumption


       Signs/Symptoms:                           aeb BMI >30 (30.90).


      1. Problem


       Problem                                   Impaired nutrient utilization


       Etiology                                  r/t endocrine dysfuction


       Signs/Symptoms:                           aeb labs () A1c 6.9%, POC


                                                 glucose 132.


     Intervention/Recommendation


      Comments                                   1.Recommend adding CCHO to


                                                 current diet Rx.


                                                 2.Provide Glucerna Hunger


                                                 Smart as appropriate when PO <


                                                 50%.


     Expected Outcomes/Goals


      Expected Outcomes/Goals                    1.PO intake to meet 75% of


                                                 estimated nutritional needs.


                                                 2.Monitor PO intake, wt,


                                                 nutrition related labs to


                                                 trend WNL, and skin integrity


                                                 to trend WNL.


                                                 3.F/U as moderate risk in 3-5


                                                 days, -.

## 2020-08-27 NOTE — PROGRESS NOTES
DATE:  08/27/2020



PSYCHIATRIC PROGRESS NOTE



SUBJECTIVE:  Chart reviewed and the patient interviewed.  Also discussed the

patient's condition with the staff and reviewed records and labs.  The patient

remains in a depressed mood and she is selectively mute and today, the patient

refused to talk to me at all and kept her eyes closed without talking and

without even responding with any sign.  She also refused to have breakfast or to

drink.  She is still staying in her bed all the time and not talking to anybody

and not interacting.  She also still has refused to take any medications.



ASSESSMENT:  The patient still seems very depressed and is still psychotic.



TREATMENT PLAN:  Continue to monitor behavior and condition closely.  Also,

planning to discuss with the patient's  further information in regard to

her condition and if she had similar episodes before and we will continue to

follow up closely.  Also, we will monitor the patient's vital signs for any

possibility of dehydration since she is not eating or drinking.





DD: 08/27/2020 13:02

DT: 08/27/2020 16:14

[facility]# [id number]  [id number]

## 2020-08-28 NOTE — PROGRESS NOTES
DATE:     08/28/2020



Case was discussed with staff of the patient, reviewed records.  The patient

continues to isolate herself, selectively mute, continues to keep her eyes

closed, not responding in anyway possible, refused to eat or drink staying in

her bed most of the time.  Continues to be depressed, psychotic.  So, I talked

to the nurse, to see if Dr. Johns will evaluate.  The patient states she needs

to be transferred to a medical facility because she could be dehydrated as she

is refusing to eat, not taking nothing.  Not taking medication.  Also, may have

to be reisedand will continue outpatient group therapy, milieu therapy, and 
adjust

the medication as needed.





DD: 08/28/2020 11:49

DT: 08/28/2020 18:22

JOB# [id number]  [id number]

HANNAH

## 2020-08-29 NOTE — PROGRESS NOTES
DATE:  08/29/2020



SUBJECTIVE:  A 75-year-old female, currently in the hospital, apparently

agitated with the , not eating, not sleeping, here in the hospital due to

serious back problems, history of depression and anxiety.  On face-to-face, the

patient noted to be irritable, unfortunately not wanting to initially engage

with me, mostly withdrawn, keeps to self, mostly isolative, selectively mute.  I

had a hard time getting any information out of her.  Concerns about her p.o.

intake, possible transfer to a medical facility if there are any concerns about

dehydration or lack of nutrition.  Discussed with staff, still depressed,

concerns for ongoing psychotic symptoms.



Medications were reviewed.  Labs reviewed.  Vitals were reviewed.



ASSESSMENT:  A 75-year-old female with ongoing symptoms, safety concerns as

noted, remains unstable.



PLAN:  We will follow alongside primary psychiatric team.  I am covering for Dr. Quiñonez over the weekend.  Medications were noted.  We will continue current

dosing for now.





DD: 08/29/2020 06:54

DT: 08/29/2020 08:52

JOB# [id number]  [id number]

## 2020-08-30 NOTE — PROGRESS NOTES
DATE:  08/30/2020



SUBJECTIVE:  A 75-year-old female, currently in the hospital, apparently

agitated with the , not eating, not sleeping, in the hospital due to

serious back problems.  I go to try to see her today.  Unfortunately, she does

not engage with me.  I tried to wake her up, but she opens her eyes, stares and

then closes them.  Difficult face-to-face, per staff, responding to name, AO to

self only, appears calm, quiet, but withdrawn, confused appearing.



ASSESSMENT:  The patient impulsive, unpredictable, hard to fully assess, still

appearing withdrawn, keeps to self.



Medications were reviewed.  Labs reviewed.  Vitals reviewed.



PLAN:  We will continue inpatient monitoring.  Continue dosing of Cymbalta,

Seroquel, ongoing symptoms, safety concerns.





DD: 08/30/2020 07:11

DT: 08/30/2020 09:00

JOB# [id number]  [id number]

## 2020-08-31 NOTE — INTERNAL MEDICINE PROG NOTE
Internal Medicine Subjective





- Subjective


Service Date: 20


Patient seen and examined:: without staff


Patient is:: awake


Patient Complaints of:: congestion


Per staff patient has:: no adverse event, no episodes of fall





Internal Medicine Objective





- Results


Result Diagrams: 


 20 08:05





 20 09:55


Recent Labs: 


 Laboratory Last Values











WBC  8.3 K/uL (4.8-10.8)   20  08:05    


 


RBC  4.31 MIL/uL (4.2-6.2)   20  08:05    


 


Hgb  12.9 g/dL (12.0-16.0)   20  08:05    


 


Hct  37.6 % (36-48)   20  08:05    


 


MCV  87 fL (79.0-98.0)   20  08:05    


 


MCH  30 pg (27-31)   20  08:05    


 


MCHC  34 % (32-36)   20  08:05    


 


RDW  14.3 % (9.0-15.0)   20  08:05    


 


Plt Count  202 K/uL (130-430)   20  08:05    


 


MPV  9.4 fl (7.4-10.4)   20  08:05    


 


Neut % (Auto)  64.6 % (40-70)   20  08:05    


 


Lymph % (Auto)  27.4 % (20.5-51.5)   20  08:05    


 


Mono % (Auto)  5.5 % (1.7-9.3)   20  08:05    


 


Eos % (Auto)  1.4 % (0-4)   20  08:05    


 


Baso % (Auto)  1.1 % (0.0-2.0)   20  08:05    


 


Neut # (Auto)  5.3 K/uL (1.8-7.7)   20  08:05    


 


Lymph # (Auto)  2.3 K/uL (1.0-5.5)   20  08:05    


 


Mono # (Auto)  0.5 K/uL (0.0-1.0)   20  08:05    


 


Eos # (Auto)  0.1 K/uL (0.0-0.4)   20  08:05    


 


Baso # (Auto)  0.1 K/uL (0.0-0.2)   20  08:05    


 


Sodium  134 mmol/L (136-145)  L  20  09:55    


 


Potassium  3.0 mmol/L (3.5-5.1)  L  20  09:55    


 


Chloride  95 mmol/L ()  L  20  09:55    


 


Carbon Dioxide  26 mmol/L (23-29)   20  09:55    


 


Anion Gap  16  (5-15)  H  20  09:55    


 


BUN  26 mg/dL (8-21)  H  20  09:55    


 


Creatinine  1.07 mg/dL (0.70-1.30)   20  09:55    


 


Glucose  125 mg/dL (70-99)  H  20  09:55    


 


POC Glucose  132 MG/DL (70 - 105)  H  20  02:47    


 


Hemoglobin A1c  6.9 % (4.8-5.6)  H  20  10:35    


 


Calcium  10.0 mg/dL (8.4-10.2)   20  09:55    


 


Total Bilirubin  0.7 mg/dL (0.0-1.0)   20  09:55    


 


AST  30 U/L (10-37)   20  09:55    


 


ALT  28 U/L (12-78)   20  09:55    


 


Alkaline Phosphatase  99 U/L ()   20  09:55    


 


Total Protein  7.0 g/dL (6.4-8.3)   20  09:55    


 


Albumin  3.4 g/dL (3.4-5.0)   20  09:55    


 


Triglycerides  169 mg/dL ()  H  20  10:35    


 


Cholesterol  206 mg/dL (<200)  H  20  10:35    


 


LDL Cholesterol  125 mg/dL (0-129)   20  10:35    


 


HDL Cholesterol  53 mg/dL (>55)  L  20  10:35    


 


TSH  3.96 uIU/ml (0.358-3.740)  H  20  13:30    


 


Coronavirus (PCR)  NOT DETECTED  (NOT DETECTD)   20  18:40    














- Physical Exam


Vitals and I&O: 


 Vital Signs











Temp  97.3 F   20 14:00


 


Pulse  109   20 14:00


 


Resp  20   20 08:00


 


BP  152/97   20 14:00


 


Pulse Ox  90   20 14:00








 Intake & Output











 20





 18:59 06:59 18:59


 


Intake Total 120 240 


 


Output Total  2 


 


Balance 120 238 


 


Intake:   


 


  Oral 120 240 


 


Output:   


 


  Urine/Stool Mix  2 


 


Other:   


 


  # Voids 1 1 


 


  # Bowel Movements 0 1 











Active Medications: 


Current Medications





Acetaminophen (Tylenol)  650 mg PO Q4H PRN


   PRN Reason: Pain (Mild 1-3)


   Stop: 10/18/20 03:15


Acetaminophen (Tylenol Extra Strength)  1,000 mg PO Q6H PRN


   PRN Reason: Pain (Moderate 4-6)


   Stop: 10/18/20 03:17


Al Hydrox/Mg Hydrox/Simethicone (Maalox)  30 ml PO Q4HR PRN


   PRN Reason: GI DISTRESS


   Stop: 10/18/20 03:29


Atorvastatin Calcium (Lipitor)  10 mg PO DAILY [facility]; Protocol


   Stop: 10/19/20 08:59


   Last Admin: 20 08:49 Dose:  Not Given


Duloxetine HCl (Cymbalta)  60 mg PO DAILY [facility]; Protocol


   Stop: 10/19/20 09:59


   Last Admin: 20 08:50 Dose:  Not Given


Ibuprofen (Motrin)  400 mg PO Q4H PRN


   PRN Reason: Pain (Severe 7-10)


   Stop: 10/18/20 03:17


Lorazepam (Ativan)  0.5 mg PO Q4HR PRN; Protocol


   PRN Reason: Anxiety


   Stop: 20 03:29


   Last Admin: 20 08:27 Dose:  0.5 mg


Magnesium Hydroxide (Milk Of Magnesia)  30 ml PO HS PRN


   PRN Reason: Constipation


Metformin HCl (Glucophage)  500 mg PO DAILY [facility]


   Stop: 10/25/20 13:59


   Last Admin: 20 08:50 Dose:  Not Given


Metoprolol Succinate (Toprol Xl)  50 mg PO DAILY [facility]


   Stop: 10/20/20 08:59


   Last Admin: 20 08:50 Dose:  Not Given


Multivitamins/Vitamin C (Theragran)  1 tab PO DAILY [facility]


   Stop: 10/18/20 08:59


   Last Admin: 20 08:50 Dose:  Not Given


Quetiapine Fumarate (Seroquel)  25 mg PO BID [facility]; Protocol


   Stop: 10/23/20 16:59


   Last Admin: 20 08:50 Dose:  Not Given


Zolpidem Tartrate (Ambien)  5 mg PO HS PRN


   PRN Reason: Insomnia


   Stop: 10/18/20 03:29








General: weak


HEENT: NC/AT, PERRLA, EOMI


Neck: Supple


Lungs: CTAB


Cardiovascular: RRR, Normal S1, Normal S2


Abdomen: soft, non-tender


Extremities: clear


Neurological: no change





- Procedures


Procedures: 


 Procedures











Procedure Code Date


 


APPLY FOREARM SPLINT 87698 16


 


GROUP PSYCHOTHERAPY 44356 16


 


GROUP PSYCHOTHERAPY [id number] 16


 


IMMOBILIZATION OF LEFT LOWER ARM USING SPLINT [id number] 16


 


OTHER GROUP THERAPY 94.44 05/27/15


 


REPOSITION LEFT RADIUS WITH INT FIX, OPEN APPROACH 6ZKL44O 16


 


TREAT FX RAD INTRA-ARTICUL 83888 16














Internal Medicine Assmt/Plan





- Assessment


Assessment: 





1. DM, new diagnosis


2. HTN


3. Hypothyroidism


4. Hypokalemia





- Plan


Plan: 





continue glucophage


repeat bmp stat


d/w r.n.


reviewed complete medical records





Nutritional Asmnt/Malnutr-PDOC





- Dietary Evaluation


Malnutrition Findings (Please click <Entered> for more info): 








Nutritional Asmnt/Malnutrition                             Start:  20 11:

54


Text:                                                      Status: Complete    

  


Freq:                                                                          

  


Protocol:                                                                      

  


 Document     20 12:02  ANASTASIYA  (Rec: 20 12:14  ANASTASIYA [id number]

-01)


 Nutritional Asmnt/Malnutrition


     Patient General Information


      Nutritional Screening                      Moderate Risk


      Diagnosis                                  Psychosis


      Pertinent Medical Hx/Surgical Hx           HTN, Hyperlipidemia


      Subjective Information                     Pt is a 75-year-old female


                                                 admitted on  d/t grave


                                                 disability and danger to self.


                                                 Pt is eating an estimated 0-


                                                 75%% of meals Per Meal/


                                                 Nutrition Activity Record


                                                 since admit date (x2 days), pt


                                                 ate 75% meals on admit day


                                                 and refused breakfast and


                                                 lunch yesterday, eating 25%


                                                 dinner. Dietary is currently


                                                 providing an estimated 2020


                                                 kcals and 95 gm Pro. Visited


                                                 pt in room, CNA stated the


                                                 patient is refusing everything


                                                 today, just having liquids.


                                                 Got her a Glucerna shake in


                                                 case she decides not to eat


                                                 lunch. Pt did not want to talk


                                                 further. Pt need some


                                                 adjusting time, will F/U in 3-


                                                 5 days and continue to monitor


                                                 PO intake and provide


                                                 Glucerna as appropriate.


                                                 Recommend adding CCHO to


                                                 current diet d/t labs ()


                                                 A1c 6.9%, POC glucose 132.


                                                 Anthropometrics


                                                 HT: 58


                                                 WT: 203 LB (92.27 kg)


                                                 ABW: 156 LB (70.80 kg)


                                                 BMI: 30.87 (Obese, class I)


                                                 GI/ Skin Integrity


                                                 GI: WNL, Soft, Large, Non-


                                                 tender


                                                 BM: 8/19 x3


                                                 I/O: 980/Not Noted


                                                 Skin: Wound and bruises, RT/LT


                                                 hand bruise, LT Forearm skin


                                                 tear


                                                 Ward: 17


                                                 Diet Order: Cardiac


                                                 Estimated Energy Needs: (Obese


                                                 , ABW)


                                                 8860-4297 kcals (20-25 kcals/


                                                 kg)


                                                 60-70g Pro (0.8-1.0 g/kg)


                                                 4263-2180 ml (20-25 ml/kg)


      Current Diet Order/ Nutrition Support      Cardiac


      Patient / S.O                              Can


      Pertinent Medications                      Maalox (PRN), Lipitor,


                                                 Hydrochlorothiazide, MOM (PRN)


                                                 , Theragran


      Pertinent Labs                             : Triglycerides 169,


                                                 Cholesterol 206, HDL 53, A1c 6


                                                 .9%, POC glucose 132


     Nutritional Hx/Data


      Height                                     1.73 m


      Height (Calculated Centimeters)            172.7


      Current Weight (lbs)                       92.079 kg


      Weight (Calculated Kilograms)              92.1


      Weight (Calculated Grams)                  34060.3


      Ideal Body Weight                          140 LB (63.64 kg)


      % Ideal Body Weight                        145


      Body Mass Index (BMI)                      30.9


      Weight Status                              Obese


     GI Symptoms


      Last BM                                    8/19 x3


      Skin Integrity/Comment:                    Skin: Wound and bruises, RT/LT


                                                 hand bruise, LT Forearm skin


                                                 tear


                                                 Ward: 17


      Current %PO                                Fair (50-74%)


     Estimated Nutritional Goals


      BEE in Kcals:                              Adj wt of IBW


      Calories/Kcals/Kg                          20-25


      Kcals Calculated                           8017-4845


      Protein:                                   Adj wt of IBW


      Protein g/k.8-1.0


      Protein Calculated                         60-70


      Fluid: ml                                  2678-5279 ml (20-25 ml/kg)


     Nutritional Problem


      2. Problem


       Problem                                   Obese, class I


       Etiology                                  r/t consistent energy


                                                 overconsumption


       Signs/Symptoms:                           aeb BMI >30 (30.90).


      1. Problem


       Problem                                   Impaired nutrient utilization


       Etiology                                  r/t endocrine dysfuction


       Signs/Symptoms:                           aeb labs () A1c 6.9%, POC


                                                 glucose 132.


     Intervention/Recommendation


      Comments                                   1.Recommend adding CCHO to


                                                 current diet Rx.


                                                 2.Provide Glucerna Hunger


                                                 Smart as appropriate when PO <


                                                 50%.


     Expected Outcomes/Goals


      Expected Outcomes/Goals                    1.PO intake to meet 75% of


                                                 estimated nutritional needs.


                                                 2.Monitor PO intake, wt,


                                                 nutrition related labs to


                                                 trend WNL, and skin integrity


                                                 to trend WNL.


                                                 3.F/U as moderate risk in 3-5


                                                 days, -.

## 2020-09-01 NOTE — INTERNAL MEDICINE PROG NOTE
Internal Medicine Subjective





- Subjective


Service Date: 20


Patient seen and examined:: without staff


Patient is:: awake


Patient Complaints of:: congestion


Per staff patient has:: no adverse event, no episodes of fall





Internal Medicine Objective





- Results


Result Diagrams: 


 20 08:05





 20 09:55


Recent Labs: 


 Laboratory Last Values











WBC  8.3 K/uL (4.8-10.8)   20  08:05    


 


RBC  4.31 MIL/uL (4.2-6.2)   20  08:05    


 


Hgb  12.9 g/dL (12.0-16.0)   20  08:05    


 


Hct  37.6 % (36-48)   20  08:05    


 


MCV  87 fL (79.0-98.0)   20  08:05    


 


MCH  30 pg (27-31)   20  08:05    


 


MCHC  34 % (32-36)   20  08:05    


 


RDW  14.3 % (9.0-15.0)   20  08:05    


 


Plt Count  202 K/uL (130-430)   20  08:05    


 


MPV  9.4 fl (7.4-10.4)   20  08:05    


 


Neut % (Auto)  64.6 % (40-70)   20  08:05    


 


Lymph % (Auto)  27.4 % (20.5-51.5)   20  08:05    


 


Mono % (Auto)  5.5 % (1.7-9.3)   20  08:05    


 


Eos % (Auto)  1.4 % (0-4)   20  08:05    


 


Baso % (Auto)  1.1 % (0.0-2.0)   20  08:05    


 


Neut # (Auto)  5.3 K/uL (1.8-7.7)   20  08:05    


 


Lymph # (Auto)  2.3 K/uL (1.0-5.5)   20  08:05    


 


Mono # (Auto)  0.5 K/uL (0.0-1.0)   20  08:05    


 


Eos # (Auto)  0.1 K/uL (0.0-0.4)   20  08:05    


 


Baso # (Auto)  0.1 K/uL (0.0-0.2)   20  08:05    


 


Sodium  134 mmol/L (136-145)  L  20  09:55    


 


Potassium  3.0 mmol/L (3.5-5.1)  L  20  09:55    


 


Chloride  95 mmol/L ()  L  20  09:55    


 


Carbon Dioxide  26 mmol/L (23-29)   20  09:55    


 


Anion Gap  16  (5-15)  H  20  09:55    


 


BUN  26 mg/dL (8-21)  H  20  09:55    


 


Creatinine  1.07 mg/dL (0.70-1.30)   20  09:55    


 


Glucose  125 mg/dL (70-99)  H  20  09:55    


 


POC Glucose  132 MG/DL (70 - 105)  H  20  02:47    


 


Hemoglobin A1c  6.9 % (4.8-5.6)  H  20  10:35    


 


Calcium  10.0 mg/dL (8.4-10.2)   20  09:55    


 


Total Bilirubin  0.7 mg/dL (0.0-1.0)   20  09:55    


 


AST  30 U/L (10-37)   20  09:55    


 


ALT  28 U/L (12-78)   20  09:55    


 


Alkaline Phosphatase  99 U/L ()   20  09:55    


 


Total Protein  7.0 g/dL (6.4-8.3)   20  09:55    


 


Albumin  3.4 g/dL (3.4-5.0)   20  09:55    


 


Triglycerides  169 mg/dL ()  H  20  10:35    


 


Cholesterol  206 mg/dL (<200)  H  20  10:35    


 


LDL Cholesterol  125 mg/dL (0-129)   20  10:35    


 


HDL Cholesterol  53 mg/dL (>55)  L  20  10:35    


 


TSH  3.96 uIU/ml (0.358-3.740)  H  20  13:30    


 


Coronavirus (PCR)  NOT DETECTED  (NOT DETECTD)   20  18:40    














- Physical Exam


Vitals and I&O: 


 Vital Signs











Temp  97.8 F   20 05:56


 


Pulse  110   20 05:56


 


Resp  20   20 07:48


 


BP  125/70   20 05:56


 


Pulse Ox  89   20 05:56








 Intake & Output











 20





 18:59 06:59 18:59


 


Intake Total 800 0 


 


Balance 800 0 


 


Intake:   


 


  Oral 800 0 


 


Other:   


 


  # Voids 3 3 


 


  # Bowel Movements 1 0 











Active Medications: 


Current Medications





Acetaminophen (Tylenol)  650 mg PO Q4H PRN


   PRN Reason: Pain (Mild 1-3)


   Stop: 10/18/20 03:15


Acetaminophen (Tylenol Extra Strength)  1,000 mg PO Q6H PRN


   PRN Reason: Pain (Moderate 4-6)


   Stop: 10/18/20 03:17


Al Hydrox/Mg Hydrox/Simethicone (Maalox)  30 ml PO Q4HR PRN


   PRN Reason: GI DISTRESS


   Stop: 10/18/20 03:29


Atorvastatin Calcium (Lipitor)  10 mg PO DAILY [facility]; Protocol


   Stop: 10/19/20 08:59


   Last Admin: 20 08:30 Dose:  Not Given


Duloxetine HCl (Cymbalta)  60 mg PO DAILY [facility]; Protocol


   Stop: 10/19/20 09:59


   Last Admin: 20 08:30 Dose:  Not Given


Ibuprofen (Motrin)  400 mg PO Q4H PRN


   PRN Reason: Pain (Severe 7-10)


   Stop: 10/18/20 03:17


Lorazepam (Ativan)  0.5 mg PO Q4HR PRN; Protocol


   PRN Reason: Anxiety


   Stop: 20 03:29


   Last Admin: 20 08:27 Dose:  0.5 mg


Magnesium Hydroxide (Milk Of Magnesia)  30 ml PO HS PRN


   PRN Reason: Constipation


Metformin HCl (Glucophage)  500 mg PO DAILY [facility]


   Stop: 10/25/20 13:59


   Last Admin: 20 08:30 Dose:  Not Given


Metoprolol Succinate (Toprol Xl)  50 mg PO DAILY [facility]


   Stop: 10/20/20 08:59


   Last Admin: 20 08:30 Dose:  Not Given


Multivitamins/Vitamin C (Theragran)  1 tab PO DAILY [facility]


   Stop: 10/18/20 08:59


   Last Admin: 20 08:30 Dose:  Not Given


Quetiapine Fumarate (Seroquel)  25 mg PO BID [facility]; Protocol


   Stop: 10/23/20 16:59


   Last Admin: 20 08:30 Dose:  Not Given


Zolpidem Tartrate (Ambien)  5 mg PO HS PRN


   PRN Reason: Insomnia


   Stop: 10/18/20 03:29








General: weak


HEENT: NC/AT, PERRLA, EOMI


Neck: Supple


Lungs: CTAB


Cardiovascular: RRR, Normal S1, Normal S2


Abdomen: soft, non-tender


Extremities: clear


Neurological: no change





- Procedures


Procedures: 


 Procedures











Procedure Code Date


 


APPLY FOREARM SPLINT 32665 16


 


GROUP PSYCHOTHERAPY 76606 16


 


GROUP PSYCHOTHERAPY [id number] 16


 


IMMOBILIZATION OF LEFT LOWER ARM USING SPLINT 6E0TK3Q 16


 


OTHER GROUP THERAPY 94.44 05/27/15


 


REPOSITION LEFT RADIUS WITH INT FIX, OPEN APPROACH 0WUH20A 16


 


TREAT FX RAD INTRA-ARTICUL 97788 16














Internal Medicine Assmt/Plan





- Assessment


Assessment: 





1. Failure to thrive


2. DM, New diagnosis


3. Hypothyroidism


4. Hypokalemia





- Plan


Plan: 


send to [facility]


d/w r.n.


d/w dr. Garcia


reviewed complete medical records





Nutritional Asmnt/Malnutr-PDOC





- Dietary Evaluation


Malnutrition Findings (Please click <Entered> for more info): 








Nutritional Asmnt/Malnutrition                             Start:  20 11:

54


Text:                                                      Status: Complete    

  


Freq:                                                                          

  


Protocol:                                                                      

  


 Document     20 12:02  ANASTASIYA  (Rec: 20 12:14  ANASTASIYA DORSEY-CTXTS

-01)


 Nutritional Asmnt/Malnutrition


     Patient General Information


      Nutritional Screening                      Moderate Risk


      Diagnosis                                  Psychosis


      Pertinent Medical Hx/Surgical Hx           HTN, Hyperlipidemia


      Subjective Information                     Pt is a 75-year-old female


                                                 admitted on  d/t grave


                                                 disability and danger to self.


                                                 Pt is eating an estimated 0-


                                                 75%% of meals Per Meal/


                                                 Nutrition Activity Record


                                                 since admit date (x2 days), pt


                                                 ate 75% meals on admit day


                                                 and refused breakfast and


                                                 lunch yesterday, eating 25%


                                                 dinner. Dietary is currently


                                                 providing an estimated 2020


                                                 kcals and 95 gm Pro. Visited


                                                 pt in room, CNA stated the


                                                 patient is refusing everything


                                                 today, just having liquids.


                                                 Got her a Glucerna shake in


                                                 case she decides not to eat


                                                 lunch. Pt did not want to talk


                                                 further. Pt need some


                                                 adjusting time, will F/U in 3-


                                                 5 days and continue to monitor


                                                 PO intake and provide


                                                 Glucerna as appropriate.


                                                 Recommend adding CCHO to


                                                 current diet d/t labs ()


                                                 A1c 6.9%, POC glucose 132.


                                                 Anthropometrics


                                                 HT: 58


                                                 WT: 203 LB (92.27 kg)


                                                 ABW: 156 LB (70.80 kg)


                                                 BMI: 30.87 (Obese, class I)


                                                 GI/ Skin Integrity


                                                 GI: WNL, Soft, Large, Non-


                                                 tender


                                                 BM: 8/19 x3


                                                 I/O: 980/Not Noted


                                                 Skin: Wound and bruises, RT/LT


                                                 hand bruise, LT Forearm skin


                                                 tear


                                                 Ward: 17


                                                 Diet Order: Cardiac


                                                 Estimated Energy Needs: (Obese


                                                 , ABW)


                                                 9318-3162 kcals (20-25 kcals/


                                                 kg)


                                                 60-70g Pro (0.8-1.0 g/kg)


                                                 3133-6383 ml (20-25 ml/kg)


      Current Diet Order/ Nutrition Support      Cardiac


      Patient / S.O                              Can


      Pertinent Medications                      Maalox (PRN), Lipitor,


                                                 Hydrochlorothiazide, MOM (PRN)


                                                 , Theragran


      Pertinent Labs                             : Triglycerides 169,


                                                 Cholesterol 206, HDL 53, A1c 6


                                                 .9%, POC glucose 132


     Nutritional Hx/Data


      Height                                     1.73 m


      Height (Calculated Centimeters)            172.7


      Current Weight (lbs)                       92.079 kg


      Weight (Calculated Kilograms)              92.1


      Weight (Calculated Grams)                  19017.3


      Ideal Body Weight                          140 LB (63.64 kg)


      % Ideal Body Weight                        145


      Body Mass Index (BMI)                      30.9


      Weight Status                              Obese


     GI Symptoms


      Last BM                                    8/19 x3


      Skin Integrity/Comment:                    Skin: Wound and bruises, RT/LT


                                                 hand bruise, LT Forearm skin


                                                 tear


                                                 Ward: 17


      Current %PO                                Fair (50-74%)


     Estimated Nutritional Goals


      BEE in Kcals:                              Adj wt of IBW


      Calories/Kcals/Kg                          20-25


      Kcals Calculated                           5093-2698


      Protein:                                   Adj wt of IBW


      Protein g/k.8-1.0


      Protein Calculated                         60-70


      Fluid: ml                                  1340-2107 ml (20-25 ml/kg)


     Nutritional Problem


      2. Problem


       Problem                                   Obese, class I


       Etiology                                  r/t consistent energy


                                                 overconsumption


       Signs/Symptoms:                           aeb BMI >30 (30.90).


      1. Problem


       Problem                                   Impaired nutrient utilization


       Etiology                                  r/t endocrine dysfuction


       Signs/Symptoms:                           aeb labs () A1c 6.9%, POC


                                                 glucose 132.


     Intervention/Recommendation


      Comments                                   1.Recommend adding CCHO to


                                                 current diet Rx.


                                                 2.Provide Glucerna Hunger


                                                 Smart as appropriate when PO <


                                                 50%.


     Expected Outcomes/Goals


      Expected Outcomes/Goals                    1.PO intake to meet 75% of


                                                 estimated nutritional needs.


                                                 2.Monitor PO intake, wt,


                                                 nutrition related labs to


                                                 trend WNL, and skin integrity


                                                 to trend WNL.


                                                 3.F/U as moderate risk in 3-5


                                                 days, -.

## 2020-09-01 NOTE — PROGRESS NOTES
DATE:     09/01/2020



SUBJECTIVE:  Case was discussed with staff of the patient, reviewed records. 

The patient continues to refuse to eat, but she drank water only addressed with

staff 3 days ago to send her to the hospital.  They talked to Dr. Guajardo, but

Dr. Guajardo felt it was not necessary.  The patient was again send here right

back, so anyway I again were trying to send her to a medical facility for

hydration and observation in medical facility because she has not been  eating 
for few

days.  The patient continues to be isolating, withdrawn, depressed.  Continues

to be psychotic, continues to have poor insight.  I did initiate reise  on her. 

We are still waiting for the date and currently she is not taking it and I will

try to also call her  to see what she think.  She apparently will be

going to a SNF facility upon discharge.  We will continue outpatient group

therapy, milieu therapy, and adjust medications as needed.

I talked to her  ,he reports history of psychosis and prior 
hospitalzation,did well on seroquel, and i explained to her  her lack of 
progress and concern for her safety ,he is supportive of our plan to be sent to 
medical unit ,also for reise .,she will be transfered to [facility] for further 
treatment ,i extended her  hold for 30 days 



DD: 09/01/2020 12:15

DT: 09/01/2020 16:33

JOB# [id number]  [id number]

HANNAH

## 2020-09-01 NOTE — PROGRESS NOTES
DATE:     08/31/2020



Covering for Dr. Quiñonez.



Case was discussed with staff of the patient, reviewed records.  The patient

continues to refuse food, refused the medication.  The patient when I first met

her she believes she is here because she has some pain.  She has been recently

selectively mute, unable to participate in a meaningful conversation.  She is

unpredictable.  She is withdrawn, keeping to herself.  I will be initiating  a 
reise

her because of her refusal for medications, refusing to eat and continues to

have poor insight, unpredictable, impulsive.  We will continue to work with the

patient in group therapy, milieu therapy, and adjust the medications as needed.





DD: 08/31/2020 12:01

DT: 09/01/2020 00:22

JOB# [id number]  [id number]

HANNAH

## 2020-09-01 NOTE — PROGRESS NOTES
DATE:     08/31/2020



ADDENDUM



Case was discussed with staff of the patient, reviewed records, lab work.  Also,

I tried to talk to the patient about her medication.  The patient talked to

about what medication would take, why she is not taking her medication.  I did

list side effects.  I asked her, she would say if there is any medication she

would take that was prescribed for her; however, the patient responds by she

would not answer me at all.  She kept her eyes closed.  I will be initiating a

reise on her.  We will continue outpatient group therapy, milieu therapy, adjust

medication as needed.





DD: 08/31/2020 12:07

DT: 08/31/2020 23:30

JOB# [id number]  [id number]

HANNAH

## 2023-02-01 ENCOUNTER — HOSPITAL ENCOUNTER (INPATIENT)
Dept: HOSPITAL 4 - SMU | Age: 78
LOS: 2 days | Discharge: NURSING, REHAB OR LONG-TERM CARE | DRG: 470 | End: 2023-02-03
Attending: STUDENT IN AN ORGANIZED HEALTH CARE EDUCATION/TRAINING PROGRAM | Admitting: STUDENT IN AN ORGANIZED HEALTH CARE EDUCATION/TRAINING PROGRAM
Payer: COMMERCIAL

## 2023-02-01 VITALS — WEIGHT: 226 LBS | BODY MASS INDEX: 34.25 KG/M2 | HEIGHT: 68 IN

## 2023-02-01 VITALS — SYSTOLIC BLOOD PRESSURE: 111 MMHG

## 2023-02-01 VITALS — SYSTOLIC BLOOD PRESSURE: 129 MMHG

## 2023-02-01 VITALS — SYSTOLIC BLOOD PRESSURE: 121 MMHG

## 2023-02-01 DIAGNOSIS — Z20.822: ICD-10-CM

## 2023-02-01 DIAGNOSIS — M16.12: Primary | ICD-10-CM

## 2023-02-01 LAB
INR PPP: 0.9 (ref 0.8–1.2)
PROTHROMBIN TIME: 9.8 SECS (ref 9.5–12.5)

## 2023-02-01 PROCEDURE — C1776 JOINT DEVICE (IMPLANTABLE): HCPCS

## 2023-02-01 PROCEDURE — 0SRB0JZ REPLACEMENT OF LEFT HIP JOINT WITH SYNTHETIC SUBSTITUTE, OPEN APPROACH: ICD-10-PCS | Performed by: STUDENT IN AN ORGANIZED HEALTH CARE EDUCATION/TRAINING PROGRAM

## 2023-02-01 PROCEDURE — C1713 ANCHOR/SCREW BN/BN,TIS/BN: HCPCS

## 2023-02-01 PROCEDURE — U0003 INFECTIOUS AGENT DETECTION BY NUCLEIC ACID (DNA OR RNA); SEVERE ACUTE RESPIRATORY SYNDROME CORONAVIRUS 2 (SARS-COV-2) (CORONAVIRUS DISEASE [COVID-19]), AMPLIFIED PROBE TECHNIQUE, MAKING USE OF HIGH THROUGHPUT TECHNOLOGIES AS DESCRIBED BY CMS-2020-01-R: HCPCS

## 2023-02-01 PROCEDURE — C9290 INJ, BUPIVACAINE LIPOSOME: HCPCS

## 2023-02-01 RX ADMIN — HYDROMORPHONE HYDROCHLORIDE PRN MG: 1 INJECTION, SOLUTION INTRAMUSCULAR; INTRAVENOUS; SUBCUTANEOUS at 11:44

## 2023-02-01 RX ADMIN — HYDROMORPHONE HYDROCHLORIDE PRN MG: 1 INJECTION, SOLUTION INTRAMUSCULAR; INTRAVENOUS; SUBCUTANEOUS at 11:59

## 2023-02-01 RX ADMIN — DEXTROSE MONOHYDRATE SCH MLS/HR: 50 INJECTION, SOLUTION INTRAVENOUS at 13:47

## 2023-02-01 RX ADMIN — HYDROMORPHONE HYDROCHLORIDE PRN MG: 1 INJECTION, SOLUTION INTRAMUSCULAR; INTRAVENOUS; SUBCUTANEOUS at 17:23

## 2023-02-01 RX ADMIN — DEXTROSE MONOHYDRATE SCH MLS/HR: 50 INJECTION, SOLUTION INTRAVENOUS at 21:13

## 2023-02-01 RX ADMIN — Medication SCH EA: at 21:13

## 2023-02-01 RX ADMIN — HYDROMORPHONE HYDROCHLORIDE SCH MG: 8 TABLET ORAL at 21:07

## 2023-02-01 RX ADMIN — SENNOSIDES AND DOCUSATE SODIUM SCH TAB: 8.6; 5 TABLET ORAL at 21:08

## 2023-02-01 RX ADMIN — HYDROMORPHONE HYDROCHLORIDE SCH MG: 8 TABLET ORAL at 14:57

## 2023-02-02 VITALS — SYSTOLIC BLOOD PRESSURE: 122 MMHG

## 2023-02-02 VITALS — SYSTOLIC BLOOD PRESSURE: 100 MMHG

## 2023-02-02 VITALS — SYSTOLIC BLOOD PRESSURE: 131 MMHG

## 2023-02-02 VITALS — SYSTOLIC BLOOD PRESSURE: 146 MMHG

## 2023-02-02 VITALS — SYSTOLIC BLOOD PRESSURE: 125 MMHG

## 2023-02-02 VITALS — SYSTOLIC BLOOD PRESSURE: 92 MMHG

## 2023-02-02 LAB
ALBUMIN SERPL BCP-MCNC: 3.1 G/DL (ref 3.4–4.8)
ALT SERPL W P-5'-P-CCNC: 19 U/L (ref 12–78)
ANION GAP SERPL CALCULATED.3IONS-SCNC: 9 MMOL/L (ref 5–15)
AST SERPL W P-5'-P-CCNC: 25 U/L (ref 10–37)
BASOPHILS # BLD AUTO: 0.1 K/UL (ref 0–0.2)
BASOPHILS NFR BLD AUTO: 0.7 % (ref 0–2)
BILIRUB SERPL-MCNC: 0.3 MG/DL (ref 0–1)
BUN SERPL-MCNC: 15 MG/DL (ref 8–21)
CALCIUM SERPL-MCNC: 8.6 MG/DL (ref 8.4–11)
CHLORIDE SERPL-SCNC: 104 MMOL/L (ref 98–107)
CREAT SERPL-MCNC: 1.36 MG/DL (ref 0.55–1.3)
EOSINOPHIL # BLD AUTO: 0.1 K/UL (ref 0–0.4)
EOSINOPHIL NFR BLD AUTO: 1.7 % (ref 0–4)
ERYTHROCYTE [DISTWIDTH] IN BLOOD BY AUTOMATED COUNT: 14.3 % (ref 9–15)
GFR SERPL CREATININE-BSD FRML MDRD: (no result) ML/MIN (ref 90–?)
GLUCOSE SERPL-MCNC: 179 MG/DL (ref 70–99)
HCT VFR BLD AUTO: 28.3 % (ref 36–48)
HGB BLD-MCNC: 9.4 G/DL (ref 12–16)
LYMPHOCYTES # BLD AUTO: 0.8 K/UL (ref 1–5.5)
LYMPHOCYTES NFR BLD AUTO: 10.3 % (ref 20.5–51.5)
MCH RBC QN AUTO: 31 PG (ref 27–31)
MCHC RBC AUTO-ENTMCNC: 33 % (ref 32–36)
MCV RBC AUTO: 92 FL (ref 79–98)
MONOCYTES # BLD MANUAL: 0.6 K/UL (ref 0–1)
MONOCYTES # BLD MANUAL: 7.6 % (ref 1.7–9.3)
NEUTROPHILS # BLD AUTO: 6.3 K/UL (ref 1.8–7.7)
NEUTROPHILS NFR BLD AUTO: 79.7 % (ref 40–70)
PLATELET # BLD AUTO: 114 K/UL (ref 130–430)
RBC # BLD AUTO: 3.07 MIL/UL (ref 4.2–6.2)
WBC # BLD AUTO: 7.9 K/UL (ref 4.8–10.8)

## 2023-02-02 RX ADMIN — SENNOSIDES AND DOCUSATE SODIUM SCH TAB: 8.6; 5 TABLET ORAL at 21:30

## 2023-02-02 RX ADMIN — DEXTROSE MONOHYDRATE SCH MLS/HR: 50 INJECTION, SOLUTION INTRAVENOUS at 04:53

## 2023-02-02 RX ADMIN — CELECOXIB SCH MG: 200 CAPSULE ORAL at 11:20

## 2023-02-02 RX ADMIN — HUMAN INSULIN PRN UNITS: 100 INJECTION, SOLUTION SUBCUTANEOUS at 21:39

## 2023-02-02 RX ADMIN — Medication SCH EA: at 21:33

## 2023-02-02 RX ADMIN — HUMAN INSULIN PRN UNITS: 100 INJECTION, SOLUTION SUBCUTANEOUS at 11:30

## 2023-02-02 RX ADMIN — Medication SCH MG: at 21:28

## 2023-02-02 RX ADMIN — Medication SCH EA: at 16:46

## 2023-02-02 RX ADMIN — HYDROMORPHONE HYDROCHLORIDE PRN MG: 1 INJECTION, SOLUTION INTRAMUSCULAR; INTRAVENOUS; SUBCUTANEOUS at 05:50

## 2023-02-02 RX ADMIN — HYDROMORPHONE HYDROCHLORIDE PRN MG: 1 INJECTION, SOLUTION INTRAMUSCULAR; INTRAVENOUS; SUBCUTANEOUS at 03:08

## 2023-02-02 RX ADMIN — CELECOXIB SCH MG: 200 CAPSULE ORAL at 23:37

## 2023-02-02 RX ADMIN — Medication SCH MG: at 08:36

## 2023-02-02 RX ADMIN — HYDROMORPHONE HYDROCHLORIDE SCH MG: 8 TABLET ORAL at 06:00

## 2023-02-02 RX ADMIN — HYDROMORPHONE HYDROCHLORIDE PRN MG: 1 INJECTION, SOLUTION INTRAMUSCULAR; INTRAVENOUS; SUBCUTANEOUS at 00:21

## 2023-02-02 RX ADMIN — Medication SCH EA: at 05:54

## 2023-02-02 RX ADMIN — Medication SCH EA: at 11:23

## 2023-02-02 RX ADMIN — HYDROMORPHONE HYDROCHLORIDE SCH MG: 8 TABLET ORAL at 13:59

## 2023-02-02 RX ADMIN — HYDROMORPHONE HYDROCHLORIDE SCH MG: 8 TABLET ORAL at 21:36

## 2023-02-02 RX ADMIN — SENNOSIDES AND DOCUSATE SODIUM SCH TAB: 8.6; 5 TABLET ORAL at 08:36

## 2023-02-02 RX ADMIN — HUMAN INSULIN PRN UNITS: 100 INJECTION, SOLUTION SUBCUTANEOUS at 05:56

## 2023-02-03 VITALS — SYSTOLIC BLOOD PRESSURE: 128 MMHG

## 2023-02-03 VITALS — SYSTOLIC BLOOD PRESSURE: 126 MMHG

## 2023-02-03 RX ADMIN — SENNOSIDES AND DOCUSATE SODIUM SCH TAB: 8.6; 5 TABLET ORAL at 08:10

## 2023-02-03 RX ADMIN — HYDROMORPHONE HYDROCHLORIDE SCH MG: 8 TABLET ORAL at 05:19

## 2023-02-03 RX ADMIN — Medication SCH EA: at 06:23

## 2023-02-03 RX ADMIN — Medication SCH MG: at 08:09
